# Patient Record
Sex: FEMALE | Race: WHITE | NOT HISPANIC OR LATINO | Employment: UNEMPLOYED | URBAN - METROPOLITAN AREA
[De-identification: names, ages, dates, MRNs, and addresses within clinical notes are randomized per-mention and may not be internally consistent; named-entity substitution may affect disease eponyms.]

---

## 2020-12-29 ENCOUNTER — HISTORICAL (OUTPATIENT)
Dept: ADMINISTRATIVE | Facility: HOSPITAL | Age: 55
End: 2020-12-29

## 2021-01-01 LAB — FINAL CULTURE: NORMAL

## 2021-07-07 ENCOUNTER — HISTORICAL (OUTPATIENT)
Dept: ADMINISTRATIVE | Facility: HOSPITAL | Age: 56
End: 2021-07-07

## 2021-07-10 LAB — FINAL CULTURE: NO GROWTH

## 2021-07-23 ENCOUNTER — TELEPHONE (OUTPATIENT)
Dept: RHEUMATOLOGY | Facility: CLINIC | Age: 56
End: 2021-07-23

## 2021-08-13 ENCOUNTER — OFFICE VISIT (OUTPATIENT)
Dept: RHEUMATOLOGY | Facility: CLINIC | Age: 56
End: 2021-08-13
Payer: MEDICAID

## 2021-08-13 VITALS
HEIGHT: 64 IN | OXYGEN SATURATION: 98 % | DIASTOLIC BLOOD PRESSURE: 87 MMHG | SYSTOLIC BLOOD PRESSURE: 133 MMHG | WEIGHT: 153 LBS | RESPIRATION RATE: 16 BRPM | HEART RATE: 84 BPM | BODY MASS INDEX: 26.12 KG/M2

## 2021-08-13 DIAGNOSIS — G62.9 NEUROPATHY: ICD-10-CM

## 2021-08-13 DIAGNOSIS — M15.8 OTHER OSTEOARTHRITIS INVOLVING MULTIPLE JOINTS: ICD-10-CM

## 2021-08-13 DIAGNOSIS — Z11.59 ENCOUNTER FOR HEPATITIS C SCREENING TEST FOR LOW RISK PATIENT: ICD-10-CM

## 2021-08-13 DIAGNOSIS — R68.89 NONSPECIFIC ABNORMAL FINDING: ICD-10-CM

## 2021-08-13 DIAGNOSIS — Z13.89 SCREENING FOR RHEUMATIC DISORDER: ICD-10-CM

## 2021-08-13 DIAGNOSIS — M25.50 POLYARTHRALGIA: Primary | ICD-10-CM

## 2021-08-13 DIAGNOSIS — Z11.1 SCREENING-PULMONARY TB: ICD-10-CM

## 2021-08-13 PROCEDURE — 99204 PR OFFICE/OUTPT VISIT, NEW, LEVL IV, 45-59 MIN: ICD-10-PCS | Mod: S$GLB,,, | Performed by: INTERNAL MEDICINE

## 2021-08-13 PROCEDURE — 99204 OFFICE O/P NEW MOD 45 MIN: CPT | Mod: S$GLB,,, | Performed by: INTERNAL MEDICINE

## 2021-08-13 RX ORDER — GABAPENTIN 300 MG/1
300 CAPSULE ORAL 2 TIMES DAILY
Qty: 60 CAPSULE | Refills: 1 | Status: SHIPPED | OUTPATIENT
Start: 2021-08-13 | End: 2021-09-22 | Stop reason: SDUPTHER

## 2021-08-13 RX ORDER — ESTRADIOL 1 MG/1
1 TABLET ORAL
COMMUNITY
Start: 2020-08-10

## 2021-08-13 RX ORDER — CARVEDILOL 3.12 MG/1
TABLET ORAL
COMMUNITY
Start: 2021-08-12

## 2021-08-13 RX ORDER — IBUPROFEN 200 MG
1 TABLET ORAL
COMMUNITY
Start: 2020-10-12

## 2021-08-13 RX ORDER — FLUTICASONE PROPIONATE 50 MCG
50 SPRAY, SUSPENSION (ML) NASAL
COMMUNITY
Start: 2020-07-08

## 2021-08-13 RX ORDER — FLUTICASONE FUROATE, UMECLIDINIUM BROMIDE AND VILANTEROL TRIFENATATE 200; 62.5; 25 UG/1; UG/1; UG/1
POWDER RESPIRATORY (INHALATION)
COMMUNITY
Start: 2021-03-24

## 2021-08-13 RX ORDER — DEXTROAMPHETAMINE SACCHARATE, AMPHETAMINE ASPARTATE, DEXTROAMPHETAMINE SULFATE AND AMPHETAMINE SULFATE 7.5; 7.5; 7.5; 7.5 MG/1; MG/1; MG/1; MG/1
30 TABLET ORAL
COMMUNITY
Start: 2020-09-18

## 2021-08-13 RX ORDER — LISINOPRIL 10 MG/1
10 TABLET ORAL DAILY
COMMUNITY
Start: 2021-05-17

## 2021-08-13 RX ORDER — ALBUTEROL SULFATE 90 UG/1
1 AEROSOL, METERED RESPIRATORY (INHALATION)
COMMUNITY
Start: 2020-09-11

## 2021-08-13 RX ORDER — CITALOPRAM 40 MG/1
40 TABLET, FILM COATED ORAL EVERY MORNING
COMMUNITY
Start: 2021-07-19

## 2021-08-13 RX ORDER — VARENICLINE TARTRATE 1 MG/1
1 TABLET, FILM COATED ORAL 2 TIMES DAILY
COMMUNITY
Start: 2021-06-17

## 2021-08-13 RX ORDER — ESCITALOPRAM OXALATE 20 MG/1
20 TABLET ORAL
COMMUNITY
Start: 2020-08-10

## 2021-08-13 RX ORDER — FAMOTIDINE 20 MG/1
20 TABLET, FILM COATED ORAL 2 TIMES DAILY
COMMUNITY

## 2021-08-13 RX ORDER — CLONAZEPAM 1 MG/1
1 TABLET ORAL
COMMUNITY
Start: 2020-09-11

## 2021-08-13 RX ORDER — KETOROLAC TROMETHAMINE 10 MG/1
TABLET, FILM COATED ORAL
COMMUNITY
Start: 2021-06-04 | End: 2022-04-27

## 2021-08-13 RX ORDER — MELOXICAM 15 MG/1
15 TABLET ORAL DAILY
COMMUNITY
Start: 2021-07-13 | End: 2022-01-27 | Stop reason: SDUPTHER

## 2021-08-13 RX ORDER — ROPINIROLE 0.5 MG/1
TABLET, FILM COATED ORAL
COMMUNITY
Start: 2021-08-12

## 2021-08-13 RX ORDER — ATORVASTATIN CALCIUM 40 MG/1
40 TABLET, FILM COATED ORAL DAILY
COMMUNITY
Start: 2021-08-11

## 2021-08-13 RX ORDER — FLUTICASONE PROPIONATE AND SALMETEROL XINAFOATE 115; 21 UG/1; UG/1
1 AEROSOL, METERED RESPIRATORY (INHALATION) 2 TIMES DAILY
COMMUNITY
Start: 2021-06-07 | End: 2022-12-20 | Stop reason: ALTCHOICE

## 2021-08-13 RX ORDER — ALBUTEROL SULFATE 0.83 MG/ML
SOLUTION RESPIRATORY (INHALATION)
COMMUNITY
Start: 2021-05-17

## 2021-09-22 ENCOUNTER — OFFICE VISIT (OUTPATIENT)
Dept: RHEUMATOLOGY | Facility: CLINIC | Age: 56
End: 2021-09-22
Payer: MEDICAID

## 2021-09-22 VITALS
OXYGEN SATURATION: 99 % | RESPIRATION RATE: 16 BRPM | WEIGHT: 157 LBS | HEIGHT: 64 IN | DIASTOLIC BLOOD PRESSURE: 74 MMHG | HEART RATE: 76 BPM | SYSTOLIC BLOOD PRESSURE: 125 MMHG | BODY MASS INDEX: 26.8 KG/M2

## 2021-09-22 DIAGNOSIS — R68.89 NONSPECIFIC ABNORMAL FINDING: ICD-10-CM

## 2021-09-22 DIAGNOSIS — M25.50 POLYARTHRALGIA: Primary | ICD-10-CM

## 2021-09-22 DIAGNOSIS — Z13.89 SCREENING FOR RHEUMATIC DISORDER: ICD-10-CM

## 2021-09-22 DIAGNOSIS — J44.9 CHRONIC OBSTRUCTIVE PULMONARY DISEASE, UNSPECIFIED COPD TYPE: ICD-10-CM

## 2021-09-22 DIAGNOSIS — G62.9 NEUROPATHY: ICD-10-CM

## 2021-09-22 DIAGNOSIS — F17.210 CIGARETTE SMOKER: ICD-10-CM

## 2021-09-22 DIAGNOSIS — M15.8 OTHER OSTEOARTHRITIS INVOLVING MULTIPLE JOINTS: ICD-10-CM

## 2021-09-22 PROCEDURE — 99406 BEHAV CHNG SMOKING 3-10 MIN: CPT | Mod: S$GLB,,, | Performed by: INTERNAL MEDICINE

## 2021-09-22 PROCEDURE — 99406 PR TOBACCO USE CESSATION INTERMEDIATE 3-10 MINUTES: ICD-10-PCS | Mod: S$GLB,,, | Performed by: INTERNAL MEDICINE

## 2021-09-22 PROCEDURE — 99214 OFFICE O/P EST MOD 30 MIN: CPT | Mod: 25,S$GLB,, | Performed by: INTERNAL MEDICINE

## 2021-09-22 PROCEDURE — 99214 PR OFFICE/OUTPT VISIT, EST, LEVL IV, 30-39 MIN: ICD-10-PCS | Mod: 25,S$GLB,, | Performed by: INTERNAL MEDICINE

## 2021-09-22 RX ORDER — PREDNISONE 20 MG/1
TABLET ORAL
COMMUNITY
Start: 2021-09-21 | End: 2022-01-27

## 2021-09-22 RX ORDER — GABAPENTIN 300 MG/1
300 CAPSULE ORAL 3 TIMES DAILY
Qty: 90 CAPSULE | Refills: 4 | Status: SHIPPED | OUTPATIENT
Start: 2021-09-22 | End: 2022-01-27

## 2021-11-12 ENCOUNTER — HISTORICAL (OUTPATIENT)
Dept: ADMINISTRATIVE | Facility: HOSPITAL | Age: 56
End: 2021-11-12

## 2021-11-14 LAB — FINAL CULTURE: NORMAL

## 2021-12-14 DIAGNOSIS — M17.12 OSTEOARTHRITIS OF LEFT KNEE: Primary | ICD-10-CM

## 2022-01-25 NOTE — PROGRESS NOTES
Subjective:      Patient ID: Romi Estrella is a 56 y.o. female.    Chief Complaint: Disease Management    HPI:   Includes joint pain with subjective swelling.   Antecedent event includes: None;  Pain location includes: joints;  Gradual onset; beginning >years ago;  Intermittent ache, Mild in severity,   Lasting >minutes, Worse during the daytime;   Improved with rest, medication, change in position and none;   Worsened with activity, overuse and stress;     Review of Systems   Constitutional: Positive for fatigue. Negative for chills and fever.   HENT: Negative for nasal congestion, ear pain, hearing loss, mouth dryness, mouth sores, nosebleeds and trouble swallowing.    Eyes: Negative for photophobia, pain, redness, visual disturbance and eye dryness.   Respiratory: Negative for cough and shortness of breath.    Cardiovascular: Negative for chest pain, palpitations and leg swelling.   Gastrointestinal: Negative for abdominal distention, abdominal pain, blood in stool, constipation, diarrhea, rectal pain, vomiting and fecal incontinence.   Endocrine: Positive for polydipsia. Negative for polyuria.   Genitourinary: Negative for bladder incontinence, dysuria, enuresis, genital sores and hematuria.   Musculoskeletal: Positive for arthralgias, joint swelling and myalgias.   Integumentary:  Negative for rash, wound and mole/lesion.   Neurological: Positive for dizziness and numbness. Negative for weakness and headaches.   Hematological: Negative for adenopathy. Bruises/bleeds easily.   Psychiatric/Behavioral: Negative for dysphoric mood and sleep disturbance. The patient is not nervous/anxious.       Past Medical History:   Diagnosis Date    Acid reflux     Anxiety     Asthma     Depression     Hyperlipidemia     Hypertension     Neuropathy     Tachycardia      Past Surgical History:   Procedure Laterality Date    ANKLE SURGERY      APPENDECTOMY      CARPAL TUNNEL RELEASE      CHOLECYSTECTOMY       HYSTERECTOMY        See the Assessment/Plan for further characterization of the HPI, ROS, Medical, Surgical, Family, and Social Histories including Tobacco use, Meds; all of which has been reviewed in Epic.    Medication List with Changes/Refills   New Medications    CHOLECALCIFEROL, VITAMIN D3, (VITAMIN D3) 25 MCG (1,000 UNIT) CAPSULE    Take 1 capsule (1,000 Units total) by mouth once daily.    TRAMADOL (ULTRAM) 50 MG TABLET    Take 2 tablets (100 mg total) by mouth 3 (three) times daily as needed for Pain (moderate to severe disease).   Current Medications    ADVAIR -21 MCG/ACTUATION HFAA INHALER    Inhale 1 puff into the lungs 2 (two) times daily.    ALBUTEROL (PROVENTIL) 2.5 MG /3 ML (0.083 %) NEBULIZER SOLUTION    use one vial in nebulizer four times a day as needed    ALBUTEROL (PROVENTIL/VENTOLIN HFA) 90 MCG/ACTUATION INHALER    Inhale 1 puff into the lungs.    ATORVASTATIN (LIPITOR) 40 MG TABLET    Take 40 mg by mouth once daily.    CARVEDILOL (COREG) 3.125 MG TABLET        CHANTIX CONTINUING MONTH BOX 1 MG TAB    Take 1 mg by mouth 2 (two) times daily.    CITALOPRAM (CELEXA) 40 MG TABLET    Take 40 mg by mouth every morning.    CLONAZEPAM (KLONOPIN) 1 MG TABLET    Take 1 mg by mouth.    DEXTROAMPHETAMINE-AMPHETAMINE 30 MG TAB    Take 30 mg by mouth.    ESCITALOPRAM OXALATE (LEXAPRO) 20 MG TABLET    Take 20 mg by mouth.    ESTRADIOL (ESTRACE) 1 MG TABLET    Take 1 mg by mouth.    FAMOTIDINE (PEPCID) 20 MG TABLET    Take 20 mg by mouth 2 (two) times daily.    FLUTICASONE PROPIONATE (FLONASE) 50 MCG/ACTUATION NASAL SPRAY    50 mcg by Nasal route.    KETOROLAC (TORADOL) 10 MG TABLET    TAKE ONE TABLET BY MOUTH THREE TIMES DAILY FOR 5 DAYS    LISINOPRIL 10 MG TABLET    Take 10 mg by mouth once daily.    METFORMIN (GLUCOPHAGE) 500 MG TABLET    Take 500 mg by mouth 2 (two) times daily.    NICOTINE (NICODERM CQ) 14 MG/24 HR    Place 1 patch onto the skin.    ROPINIROLE (REQUIP) 0.5 MG TABLET        TRELEGY  "ELLIPTA 200-62.5-25 MCG INHALER    inhale one puff by mouth once a day. rinse mouth after each dose   Changed and/or Refilled Medications    Modified Medication Previous Medication    GABAPENTIN (NEURONTIN) 300 MG CAPSULE gabapentin (NEURONTIN) 300 MG capsule       Take 1 capsule (300 mg total) by mouth 4 (four) times daily.    Take 1 capsule (300 mg total) by mouth 3 (three) times daily.    MELOXICAM (MOBIC) 15 MG TABLET meloxicam (MOBIC) 15 MG tablet       Take 1 tablet (15 mg total) by mouth daily as needed for Pain.    Take 15 mg by mouth once daily.   Discontinued Medications    PREDNISONE (DELTASONE) 20 MG TABLET             Objective:   /74   Pulse 63   Resp 16   Ht 5' 4" (1.626 m)   Wt 78.9 kg (174 lb)   SpO2 98%   BMI 29.87 kg/m²   Physical Exam  Non-toxic appearance. No distress.   Normocephalic and atraumatic.   External ears normal.    Conjunctivae and EOM are normal. No scleral icterus.   RRR, No friction rub; palpable distal pulses.    No tachypnea or signs of respiratory distress.   Abdominal: No guarding or rebound tenderness.   Neurological: Oriented. Normal thought content. No cranial nerve deficit. Strength is grossly normal without focal deficit.  Skin is warm. No pallor.   Musculoskeletal: DJD including thumb. see below for further input. No overt synovitis.     No image results found.    No visits with results within 1 Year(s) from this visit.   Latest known visit with results is:   No results found for any previous visit.        All of the data above and below has been reviewed by myself and any further interpretations will be reflected in the assessment and plan.   The data includes review of external notes, and independent interpretation of lab results, x-rays, and imaging reports.  Active inflammatory arthritis is an illness that poses a threat to bodily function and even a threat to life in some cases.  Drug therapy to treat inflammatory arthritis usually requires intensive " monitoring for toxicity.    Review of patient's allergies indicates:  No Known Allergies  Assessment/Plan:       Prev noted/prior plan:  (No overt synovitis but some recent prednisone noted  Diminished breath sounds but otherwise clear     I am not seeing active inflammatory arthritis or connective tissue disease on exam, but she had recent prednisone.     Verbal education and some written     Blood work to further evaluate     Add gabapentin 300mg qhs increasing to bid per response     Revisit 5 weeks   See below)     Last visit:     Interim lab 9/3/21:     WBC 8.5; Hgb 13.9; plt 236     Creat 0.93; alb 3.8     C3 155  C4 39     SPEP normal     LAURENT, RF, CCP not done     UA not done     HLAB27 neg Uric acid 5.5 HepBsAbsAgcoreAb neg HCV neg     CPK 76     ESR 19 CRP 7     She will consider getting lab elsewhere as some not done and they also never sent the results noted     She has some interim shoulder pain symptoms of inflammatory arthritis in feet and pain in legs     gabapentin 300mg bid      Meloxicam 15mg daily and toradol and      she should not combine choose one or other and clarify with prescribing MD as per ongoing plan     No overt synovitis but she is on prednisone 20mg for bronchitis with PCP noted  Diminished breath sounds prolonged expiration wheezing     Went over lab with her     She should consider also working with Orthopedics and Physical Medicine and Rehab MD if needed and      Verbal education     She will plan to call if problem or question or joint swelling recurs, but it sounds like mostly mechanical and some neurogenic pain     Increase gabapentin 300mg tid     Otherwise 4 months with lab 2 weeks prior     Smoking cessation counseled 3 minutes noted     Contact us prn    This visit:     Interim lab 1/13/22:      LAURENT neg CCP neg    C3 135  C4 28    Creat 0.99 GFR 58; alb 3.2 (prior 3.8)    glc 184    WBC 10.5; Hgb 11.9 MCV plt 261    UA SG>1.03    ESR 30 CRP 3      Meloxicam 15mg daily prn  and toradol and   she should not combine choose one or other and clarify with prescribing MD as per ongoing plan prev noted     gabapentin 300mg tid    She is currently finishing an antibiotic for COPD noted  Metformin added interim and she has been dieting and lost some weight    She is disabled from her COPD and thought COVID made things worse; on O2 at night; no interim prednisone    She has some interim symptoms of inflammatory arthritis; also neurogenic and mechanical pain; including feet and hands; neck back hips knees    No overt synovitis  Diminished breath sounds but mostly clear    Went over lab     Sat and talked listened    Verbal education    Xrays to further evaluate    DEXA screening     Add vitamin D 1000 units daily if not using    Increase gabapentin 300mg qid and could be increased further     Refill meloxicam 15mg daily prn    Add tramadol 100mg tid prn #7 day supply with care and caveats so that her insurance will cover and plan for her to stretch it out as may not need everyday    Revisit with lab 2 weeks prior    Contact us prn      ANA1:320+ with negative specific Ab repeat LAURENT neg RF neg CCP neg HLAB27 neg Uric acid 5.5 HepBsAbsAgcoreAb neg HCV neg     There is some history to suggest inflammatory arthritis.  Joint pain and swelling in knees  Neurogenic pain in legs and feet     Prev noted/prior plan:  (No overt synovitis but some recent prednisone noted  Diminished breath sounds but otherwise clear   I am not seeing active inflammatory arthritis or connective tissue disease on exam, but she had recent prednisone.   Verbal education and some written   Blood work to further evaluate  Add gabapentin 300mg qhs increasing to bid per response  Revisit 5 weeks   See below)     She tells me when she has joint pain and swelling,   she will have skin changes and bumps in the palm of her hands prior to the onset;   she will plan to have evaluation with Dermatology and see if she can also take a  picture when recurs     There is also some mechanical and neurogenic pain.     Neuropathy per records     Carpal tunnel surgery     ankle surgery     severe COPD; steroid dependent; chronic hypercapnic respiratory failure; history COVID infection; history recurrent Pneumonias;      Has been managing with:     Meloxicam 15mg daily     Toradol she should not combine with meloxicam which she uses meloxicam     Prednisone 20mg bid 7 days given weeks prior to initial visit     requip      adderall noted     Citalopram      she rather clarifies she no longer uses Lexapro noted     Albuterol     lipitor     She tells me she also has diabetes and had been on insulin in the past noted     Has been working with:     Severe COPD working with Pulmonary     Records PCP Saulojens Jeffries FNMARIN some of most recent note 7/1/21: HPI cough and wheeze; A/P: COPD acute respiratory infection; LAURENT+: Prednisone 20mg bid for 7 days; levofloxacin 500mg daily 7 days     Records Dr. Leblanc 6/26/21: COPD likely severe; chronic hypercapnic respiratory failure; history COVID infection; history recurrent Pneumonias; active tobacco use; obtain CXR and PFTs from PCP or her Pulmonologist; cont inhaler with trelegy for maintenance therapy and albuterol nebulizer treatments; review ABG results from recent hospitalization and assess need for trilogy machine. F/u 1 month     Records Payal CHUNG some of recent note 3/24/21: Hospitalized 12/2020 [facility] for sepsis and bladder infection; completed antibiotics and symptoms resolved. Impression: chronic respiratory failure; severe COPD; steroid dependent; levaquin and prednisone taper; CT chest; cont chatix; revisit 6-8 weeks     Review of medical records as stated above.  Lab +/- imaging and other ordered diagnostic studies to further evaluate.  See the orders associated with this note visit.  Medications as prescribed as tolerated.    Education including verbal and those noted in the  patient instructions.  Revisit as scheduled and call prn.    The following diagnoses influence medical decision making and/or need further workup including the orders listed below:    Polyarthralgia  -     LAURENT by IFA, w/Rflx; Future; Expected date: 04/11/2022  -     Comprehensive Metabolic Panel; Future; Expected date: 04/11/2022  -     C-Reactive Protein; Future; Expected date: 04/11/2022  -     Anti-DNA Ab, Double-Stranded; Future; Expected date: 04/11/2022  -     C3 Complement; Future; Expected date: 04/11/2022  -     C4 Complement; Future; Expected date: 04/11/2022  -     CBC Auto Differential; Future; Expected date: 04/11/2022  -     Urinalysis Microscopic; Future; Expected date: 04/11/2022  -     Sedimentation rate; Future; Expected date: 04/11/2022  -     CK; Future; Expected date: 04/11/2022  -     traMADoL (ULTRAM) 50 mg tablet; Take 2 tablets (100 mg total) by mouth 3 (three) times daily as needed for Pain (moderate to severe disease).  Dispense: 42 tablet; Refill: 0  -     meloxicam (MOBIC) 15 MG tablet; Take 1 tablet (15 mg total) by mouth daily as needed for Pain.  Dispense: 30 tablet; Refill: 4  -     X-Ray Hand 3 View Bilateral; Future; Expected date: 01/27/2022  -     X-Ray Hips Bilateral 2 View Inc AP Pelvis; Future; Expected date: 01/27/2022  -     X-Ray Cervical Spine AP And Lateral; Future; Expected date: 01/27/2022  -     X-ray AP Standing Knees with Both Latera; Future; Expected date: 01/27/2022  -     X-Ray Foot 2 View Bilateral; Future; Expected date: 01/27/2022  -     X-Ray Lumbar Spine AP And Lateral; Future; Expected date: 01/27/2022    Neuropathy  -     LAURENT by IFA, w/Rflx; Future; Expected date: 04/11/2022  -     Comprehensive Metabolic Panel; Future; Expected date: 04/11/2022  -     C-Reactive Protein; Future; Expected date: 04/11/2022  -     Anti-DNA Ab, Double-Stranded; Future; Expected date: 04/11/2022  -     C3 Complement; Future; Expected date: 04/11/2022  -     C4 Complement;  Future; Expected date: 04/11/2022  -     CBC Auto Differential; Future; Expected date: 04/11/2022  -     Urinalysis Microscopic; Future; Expected date: 04/11/2022  -     Sedimentation rate; Future; Expected date: 04/11/2022  -     CK; Future; Expected date: 04/11/2022  -     traMADoL (ULTRAM) 50 mg tablet; Take 2 tablets (100 mg total) by mouth 3 (three) times daily as needed for Pain (moderate to severe disease).  Dispense: 42 tablet; Refill: 0  -     gabapentin (NEURONTIN) 300 MG capsule; Take 1 capsule (300 mg total) by mouth 4 (four) times daily.  Dispense: 120 capsule; Refill: 4    Other osteoarthritis involving multiple joints  -     LAURENT by IFA, w/Rflx; Future; Expected date: 04/11/2022  -     Comprehensive Metabolic Panel; Future; Expected date: 04/11/2022  -     C-Reactive Protein; Future; Expected date: 04/11/2022  -     Anti-DNA Ab, Double-Stranded; Future; Expected date: 04/11/2022  -     C3 Complement; Future; Expected date: 04/11/2022  -     C4 Complement; Future; Expected date: 04/11/2022  -     CBC Auto Differential; Future; Expected date: 04/11/2022  -     Urinalysis Microscopic; Future; Expected date: 04/11/2022  -     Sedimentation rate; Future; Expected date: 04/11/2022  -     CK; Future; Expected date: 04/11/2022  -     traMADoL (ULTRAM) 50 mg tablet; Take 2 tablets (100 mg total) by mouth 3 (three) times daily as needed for Pain (moderate to severe disease).  Dispense: 42 tablet; Refill: 0  -     meloxicam (MOBIC) 15 MG tablet; Take 1 tablet (15 mg total) by mouth daily as needed for Pain.  Dispense: 30 tablet; Refill: 4  -     X-Ray Hand 3 View Bilateral; Future; Expected date: 01/27/2022  -     X-Ray Hips Bilateral 2 View Inc AP Pelvis; Future; Expected date: 01/27/2022  -     X-Ray Cervical Spine AP And Lateral; Future; Expected date: 01/27/2022  -     X-ray AP Standing Knees with Both Latera; Future; Expected date: 01/27/2022  -     X-Ray Foot 2 View Bilateral; Future; Expected date:  01/27/2022  -     X-Ray Lumbar Spine AP And Lateral; Future; Expected date: 01/27/2022    Nonspecific abnormal finding  -     LAURENT by IFA, w/Rflx; Future; Expected date: 04/11/2022  -     Comprehensive Metabolic Panel; Future; Expected date: 04/11/2022  -     C-Reactive Protein; Future; Expected date: 04/11/2022  -     Anti-DNA Ab, Double-Stranded; Future; Expected date: 04/11/2022  -     C3 Complement; Future; Expected date: 04/11/2022  -     C4 Complement; Future; Expected date: 04/11/2022  -     CBC Auto Differential; Future; Expected date: 04/11/2022  -     Urinalysis Microscopic; Future; Expected date: 04/11/2022  -     Sedimentation rate; Future; Expected date: 04/11/2022  -     CK; Future; Expected date: 04/11/2022    Chronic obstructive pulmonary disease, unspecified COPD type  -     LAURENT by IFA, w/Rflx; Future; Expected date: 04/11/2022  -     Comprehensive Metabolic Panel; Future; Expected date: 04/11/2022  -     C-Reactive Protein; Future; Expected date: 04/11/2022  -     Anti-DNA Ab, Double-Stranded; Future; Expected date: 04/11/2022  -     C3 Complement; Future; Expected date: 04/11/2022  -     C4 Complement; Future; Expected date: 04/11/2022  -     CBC Auto Differential; Future; Expected date: 04/11/2022  -     Urinalysis Microscopic; Future; Expected date: 04/11/2022  -     Sedimentation rate; Future; Expected date: 04/11/2022  -     CK; Future; Expected date: 04/11/2022    Screening for rheumatic disorder  -     LAURENT by IFA, w/Rflx; Future; Expected date: 04/11/2022  -     Comprehensive Metabolic Panel; Future; Expected date: 04/11/2022  -     C-Reactive Protein; Future; Expected date: 04/11/2022  -     Anti-DNA Ab, Double-Stranded; Future; Expected date: 04/11/2022  -     C3 Complement; Future; Expected date: 04/11/2022  -     C4 Complement; Future; Expected date: 04/11/2022  -     CBC Auto Differential; Future; Expected date: 04/11/2022  -     Urinalysis Microscopic; Future; Expected date:  04/11/2022  -     Sedimentation rate; Future; Expected date: 04/11/2022  -     CK; Future; Expected date: 04/11/2022  -     X-Ray Hand 3 View Bilateral; Future; Expected date: 01/27/2022  -     X-Ray Hips Bilateral 2 View Inc AP Pelvis; Future; Expected date: 01/27/2022  -     X-Ray Cervical Spine AP And Lateral; Future; Expected date: 01/27/2022  -     X-ray AP Standing Knees with Both Latera; Future; Expected date: 01/27/2022  -     X-Ray Foot 2 View Bilateral; Future; Expected date: 01/27/2022  -     X-Ray Lumbar Spine AP And Lateral; Future; Expected date: 01/27/2022    Renal insufficiency  -     LAURENT by IFA, w/Rflx; Future; Expected date: 04/11/2022  -     Comprehensive Metabolic Panel; Future; Expected date: 04/11/2022  -     C-Reactive Protein; Future; Expected date: 04/11/2022  -     Anti-DNA Ab, Double-Stranded; Future; Expected date: 04/11/2022  -     C3 Complement; Future; Expected date: 04/11/2022  -     C4 Complement; Future; Expected date: 04/11/2022  -     CBC Auto Differential; Future; Expected date: 04/11/2022  -     Urinalysis Microscopic; Future; Expected date: 04/11/2022  -     Sedimentation rate; Future; Expected date: 04/11/2022  -     CK; Future; Expected date: 04/11/2022    Long term (current) use of systemic steroids  -     DXA Bone Density Spine And Hip; Future; Expected date: 01/27/2022  -     cholecalciferol, vitamin D3, (VITAMIN D3) 25 mcg (1,000 unit) capsule; Take 1 capsule (1,000 Units total) by mouth once daily.  Dispense: 30 capsule; Refill: 12    Screening for osteoporosis  -     DXA Bone Density Spine And Hip; Future; Expected date: 01/27/2022  -     cholecalciferol, vitamin D3, (VITAMIN D3) 25 mcg (1,000 unit) capsule; Take 1 capsule (1,000 Units total) by mouth once daily.  Dispense: 30 capsule; Refill: 12      Follow up in about 3 months (around 4/27/2022).     Zachery Brooks MD

## 2022-01-27 ENCOUNTER — OFFICE VISIT (OUTPATIENT)
Dept: RHEUMATOLOGY | Facility: CLINIC | Age: 57
End: 2022-01-27
Payer: MEDICAID

## 2022-01-27 VITALS
BODY MASS INDEX: 29.71 KG/M2 | SYSTOLIC BLOOD PRESSURE: 120 MMHG | WEIGHT: 174 LBS | HEART RATE: 63 BPM | HEIGHT: 64 IN | OXYGEN SATURATION: 98 % | DIASTOLIC BLOOD PRESSURE: 74 MMHG | RESPIRATION RATE: 16 BRPM

## 2022-01-27 DIAGNOSIS — G62.9 NEUROPATHY: ICD-10-CM

## 2022-01-27 DIAGNOSIS — M25.50 POLYARTHRALGIA: Primary | ICD-10-CM

## 2022-01-27 DIAGNOSIS — Z13.820 SCREENING FOR OSTEOPOROSIS: ICD-10-CM

## 2022-01-27 DIAGNOSIS — J44.9 CHRONIC OBSTRUCTIVE PULMONARY DISEASE, UNSPECIFIED COPD TYPE: ICD-10-CM

## 2022-01-27 DIAGNOSIS — Z79.52 LONG TERM (CURRENT) USE OF SYSTEMIC STEROIDS: ICD-10-CM

## 2022-01-27 DIAGNOSIS — Z13.89 SCREENING FOR RHEUMATIC DISORDER: ICD-10-CM

## 2022-01-27 DIAGNOSIS — R68.89 NONSPECIFIC ABNORMAL FINDING: ICD-10-CM

## 2022-01-27 DIAGNOSIS — M15.8 OTHER OSTEOARTHRITIS INVOLVING MULTIPLE JOINTS: ICD-10-CM

## 2022-01-27 DIAGNOSIS — N28.9 RENAL INSUFFICIENCY: ICD-10-CM

## 2022-01-27 PROCEDURE — 99214 PR OFFICE/OUTPT VISIT, EST, LEVL IV, 30-39 MIN: ICD-10-PCS | Mod: S$GLB,,, | Performed by: INTERNAL MEDICINE

## 2022-01-27 PROCEDURE — 3074F SYST BP LT 130 MM HG: CPT | Mod: CPTII,S$GLB,, | Performed by: INTERNAL MEDICINE

## 2022-01-27 PROCEDURE — 3074F PR MOST RECENT SYSTOLIC BLOOD PRESSURE < 130 MM HG: ICD-10-PCS | Mod: CPTII,S$GLB,, | Performed by: INTERNAL MEDICINE

## 2022-01-27 PROCEDURE — 1159F PR MEDICATION LIST DOCUMENTED IN MEDICAL RECORD: ICD-10-PCS | Mod: CPTII,S$GLB,, | Performed by: INTERNAL MEDICINE

## 2022-01-27 PROCEDURE — 3008F BODY MASS INDEX DOCD: CPT | Mod: CPTII,S$GLB,, | Performed by: INTERNAL MEDICINE

## 2022-01-27 PROCEDURE — 99214 OFFICE O/P EST MOD 30 MIN: CPT | Mod: S$GLB,,, | Performed by: INTERNAL MEDICINE

## 2022-01-27 PROCEDURE — 3078F PR MOST RECENT DIASTOLIC BLOOD PRESSURE < 80 MM HG: ICD-10-PCS | Mod: CPTII,S$GLB,, | Performed by: INTERNAL MEDICINE

## 2022-01-27 PROCEDURE — 3078F DIAST BP <80 MM HG: CPT | Mod: CPTII,S$GLB,, | Performed by: INTERNAL MEDICINE

## 2022-01-27 PROCEDURE — 1160F RVW MEDS BY RX/DR IN RCRD: CPT | Mod: CPTII,S$GLB,, | Performed by: INTERNAL MEDICINE

## 2022-01-27 PROCEDURE — 1160F PR REVIEW ALL MEDS BY PRESCRIBER/CLIN PHARMACIST DOCUMENTED: ICD-10-PCS | Mod: CPTII,S$GLB,, | Performed by: INTERNAL MEDICINE

## 2022-01-27 PROCEDURE — 1159F MED LIST DOCD IN RCRD: CPT | Mod: CPTII,S$GLB,, | Performed by: INTERNAL MEDICINE

## 2022-01-27 PROCEDURE — 3008F PR BODY MASS INDEX (BMI) DOCUMENTED: ICD-10-PCS | Mod: CPTII,S$GLB,, | Performed by: INTERNAL MEDICINE

## 2022-01-27 RX ORDER — METFORMIN HYDROCHLORIDE 500 MG/1
500 TABLET ORAL 2 TIMES DAILY
COMMUNITY
Start: 2022-01-20 | End: 2022-12-20 | Stop reason: ALTCHOICE

## 2022-01-27 RX ORDER — TRAMADOL HYDROCHLORIDE 50 MG/1
100 TABLET ORAL 3 TIMES DAILY PRN
Qty: 42 TABLET | Refills: 0 | Status: SHIPPED | OUTPATIENT
Start: 2022-01-27 | End: 2022-02-03

## 2022-01-27 RX ORDER — GABAPENTIN 300 MG/1
300 CAPSULE ORAL 4 TIMES DAILY
Qty: 120 CAPSULE | Refills: 4 | Status: SHIPPED | OUTPATIENT
Start: 2022-01-27 | End: 2022-04-27 | Stop reason: SDUPTHER

## 2022-01-27 RX ORDER — MELOXICAM 15 MG/1
15 TABLET ORAL DAILY PRN
Qty: 30 TABLET | Refills: 4 | Status: SHIPPED | OUTPATIENT
Start: 2022-01-27 | End: 2022-04-27 | Stop reason: SDUPTHER

## 2022-01-27 RX ORDER — VIT C/E/ZN/COPPR/LUTEIN/ZEAXAN 250MG-90MG
1000 CAPSULE ORAL DAILY
Qty: 30 CAPSULE | Refills: 12 | Status: SHIPPED | OUTPATIENT
Start: 2022-01-27 | End: 2022-04-27 | Stop reason: SDUPTHER

## 2022-02-28 RX ORDER — TRAMADOL HYDROCHLORIDE 50 MG/1
100 TABLET ORAL 3 TIMES DAILY PRN
Qty: 42 TABLET | Refills: 0 | Status: SHIPPED | OUTPATIENT
Start: 2022-02-28 | End: 2022-03-07

## 2022-02-28 RX ORDER — TRAMADOL HYDROCHLORIDE 50 MG/1
50 TABLET ORAL EVERY 6 HOURS
COMMUNITY
End: 2022-02-28 | Stop reason: SDUPTHER

## 2022-04-13 NOTE — PROGRESS NOTES
Subjective:      Patient ID: Romi Estrella is a 56 y.o. female.    Chief Complaint: Disease Management    HPI:   Includes joint pain with subjective swelling.   Antecedent event includes: None;  Pain location includes: shoulders, hands, knees, feet and back;  Gradual onset; beginning >years ago;  Constant ache, Mild in severity,   Lasting >minutes, Worse during the daytime;   Improved with rest, medication, change in position and none;   Worsened with activity and overuse;     Review of Systems   Constitutional: Positive for fatigue. Negative for chills and fever.   HENT: Negative for nasal congestion, ear pain, hearing loss, mouth dryness, mouth sores, nosebleeds and trouble swallowing.    Eyes: Negative for photophobia, pain, redness, visual disturbance and eye dryness.   Respiratory: Positive for shortness of breath. Negative for cough.    Cardiovascular: Positive for palpitations. Negative for chest pain and leg swelling.   Gastrointestinal: Negative for abdominal distention, abdominal pain, blood in stool, constipation, diarrhea, rectal pain, vomiting and fecal incontinence.   Endocrine: Negative for polydipsia and polyuria.   Genitourinary: Positive for nocturia. Negative for bladder incontinence, dysuria, enuresis, genital sores and hematuria.   Musculoskeletal: Positive for arthralgias, back pain, joint swelling and myalgias.   Integumentary:  Negative for rash, wound and mole/lesion.   Neurological: Positive for weakness, numbness and headaches.   Hematological: Negative for adenopathy. Bruises/bleeds easily.   Psychiatric/Behavioral: Negative for dysphoric mood and sleep disturbance. The patient is nervous/anxious.       Past Medical History:   Diagnosis Date    Acid reflux     Anxiety     Asthma     Depression     Hyperlipidemia     Hypertension     Neuropathy     Tachycardia      Past Surgical History:   Procedure Laterality Date    ANKLE SURGERY      APPENDECTOMY      CARPAL TUNNEL RELEASE       CHOLECYSTECTOMY      HYSTERECTOMY        See the Assessment/Plan for further characterization of the HPI, ROS, Medical, Surgical, Family, and Social Histories including Tobacco use, Meds; all of which has been reviewed in Epic.    Medication List with Changes/Refills   New Medications    TRAMADOL (ULTRAM) 50 MG TABLET    Take 1 tablet (50 mg total) by mouth 2 (two) times daily as needed (moderate to severe pain).   Current Medications    ADVAIR -21 MCG/ACTUATION HFAA INHALER    Inhale 1 puff into the lungs 2 (two) times daily.    ALBUTEROL (PROVENTIL) 2.5 MG /3 ML (0.083 %) NEBULIZER SOLUTION    use one vial in nebulizer four times a day as needed    ALBUTEROL (PROVENTIL/VENTOLIN HFA) 90 MCG/ACTUATION INHALER    Inhale 1 puff into the lungs.    ATORVASTATIN (LIPITOR) 40 MG TABLET    Take 40 mg by mouth once daily.    BUTALBITAL-ACETAMINOPHEN-CAFF -40 MG CAP    TAKE ONE CAPSULE BY MOUTH EVERY 6 HOURS AS NEEDED FOR HEADACHE    CARVEDILOL (COREG) 3.125 MG TABLET        CHANTIX CONTINUING MONTH BOX 1 MG TAB    Take 1 mg by mouth 2 (two) times daily.    CITALOPRAM (CELEXA) 40 MG TABLET    Take 40 mg by mouth every morning.    CLONAZEPAM (KLONOPIN) 1 MG TABLET    Take 1 mg by mouth.    DEXTROAMPHETAMINE-AMPHETAMINE 30 MG TAB    Take 30 mg by mouth.    ESCITALOPRAM OXALATE (LEXAPRO) 20 MG TABLET    Take 20 mg by mouth.    ESTRADIOL (ESTRACE) 1 MG TABLET    Take 1 mg by mouth.    FAMOTIDINE (PEPCID) 20 MG TABLET    Take 20 mg by mouth 2 (two) times daily.    FLUTICASONE PROPIONATE (FLONASE) 50 MCG/ACTUATION NASAL SPRAY    50 mcg by Nasal route.    LISINOPRIL 10 MG TABLET    Take 10 mg by mouth once daily.    METFORMIN (GLUCOPHAGE) 500 MG TABLET    Take 500 mg by mouth 2 (two) times daily.    NICOTINE (NICODERM CQ) 14 MG/24 HR    Place 1 patch onto the skin.    ROPINIROLE (REQUIP) 0.5 MG TABLET        TRELEGY ELLIPTA 200-62.5-25 MCG INHALER    inhale one puff by mouth once a day. rinse mouth after each  "dose   Changed and/or Refilled Medications    Modified Medication Previous Medication    CHOLECALCIFEROL, VITAMIN D3, (VITAMIN D3) 25 MCG (1,000 UNIT) CAPSULE cholecalciferol, vitamin D3, (VITAMIN D3) 25 mcg (1,000 unit) capsule       Take 1 capsule (1,000 Units total) by mouth once daily.    Take 1 capsule (1,000 Units total) by mouth once daily.    GABAPENTIN (NEURONTIN) 300 MG CAPSULE gabapentin (NEURONTIN) 300 MG capsule       Take 1 capsule (300 mg total) by mouth 4 (four) times daily.    Take 1 capsule (300 mg total) by mouth 4 (four) times daily.    MELOXICAM (MOBIC) 15 MG TABLET meloxicam (MOBIC) 15 MG tablet       Take 1 tablet (15 mg total) by mouth daily as needed for Pain.    Take 1 tablet (15 mg total) by mouth daily as needed for Pain.   Discontinued Medications    KETOROLAC (TORADOL) 10 MG TABLET    TAKE ONE TABLET BY MOUTH THREE TIMES DAILY FOR 5 DAYS    TRAMADOL 100 MG TAB    Take 100 mg by mouth 3 (three) times daily as needed (moderate to severe pain).         Objective:   /64   Pulse (!) 56   Resp 15   Ht 5' 4" (1.626 m)   Wt 70.3 kg (155 lb)   SpO2 98%   BMI 26.61 kg/m²   Physical Exam  Non-toxic appearance. No distress.   Normocephalic and atraumatic.   External ears normal.    Conjunctivae and EOM are normal. No scleral icterus.   RRR, No friction rub; palpable distal pulses.    No tachypnea or signs of respiratory distress.   Abdominal: No guarding or rebound tenderness.   Neurological: Oriented. Normal thought content. No cranial nerve deficit. Strength is grossly normal without focal deficit.  Skin is warm. No pallor.   Musculoskeletal: DJD. see below for further input. No overt synovitis.     [facility] UNKNOWN RAD EAP                                RADIOLOGY REPORT        PT NAME: MUNA RAWLS      [facility]     : 1965 F 56             [address]    ACCT: [id number]                                              [facility] Rec #: " [id number]                                        [id number]    Patient Location: [place].[facility]/             Procedure: FOOT LIMITED 2 VIEWS    REQUISITION #: [id number]      REPORT #: [id number]           DATE OF EXAM: 22    TIME OF EXAM: 1236       Technique: Frontal, oblique, and lateral views of the right foot.        Clinical history: Arthritis.        COMPARISON: None.        FINDINGS:        No fracture or dislocation. Family mild joint space loss at the   interphalangeal joints of the toes and at the first digit tarsometatarsal   joint. No significant osseous erosions seen. Screws are noted in the medial    malleolus of the right ankle. Soft tissues unremarkable.        IMPRESSION:        Only mild osteoarthritic changes in the right foot. Screws noted in the   medial malleolus of the right ankle.                DICTATING PHYSICIAN:  LAST HOFFMAN MD                   Date Dictated: 22        Signed By:  LAST HOFFMAN MD <Electronically signed by LAST HOFFMAN MD in    OV>    Date Signed:  22     CC: RONEN CASTRO MD ; RONEN CASTRO MD      ADMITTING PHYSICIAN:                                                                                                    ORDERING PHY: RONEN CASTRO MD                                                                                                                                                      ATTENDING PHY: RONEN CASTRO MD    Patient Status:  REG CLI    Admit Service Date: 22       X-Ray Lumbar Spine 2 Or 3 Views                                RADIOLOGY REPORT        PT NAME: MUNA RAWLS      [facility]     : 1965 F 56             [address]    ACCT: [id number]                                              [facility] Rec #: [id number]                                        [id number]    Patient Location: [place]/             Procedure: SPINE LUMBSAC LM 2V OR 3V    REQUISITION #: [id number]       REPORT #: [id number]           DATE OF EXAM: 22    TIME OF EXAM: 1237       CLINICAL HISTORY:    Arthritis        TECHNIQUE:    Views: 3 views of the lumbar spine    Limitations: No technical limitations.        COMPARISON:    No prior relevant studies.        FINDINGS:    Alignment:Normal.        Vertebral bodies and posterior elements:    1. Degenerative changes noted in the posterior facets from L3 to S1.        Disc spaces: Normal.        Soft tissues: Normal.        Additional findings: None seen.        IMPRESSION:        1.  Degenerative changes.            This document was created using a voice recognition transcribing system.   Incorrect words or phrases may have been missed during proof reading. Please   interpret accordingly or contact the radiologist for clarification if   necessary.        DICTATING PHYSICIAN:  GERMÁN CERRATO MD                   Date Dictated: 22        Signed By:  GERMÁN CERRATO MD <Electronically signed by GERMÁN CERRATO MD in OV>    Date Signed:  22     CC: RONEN CASTRO MD ; RONEN CASTRO MD      ADMITTING PHYSICIAN:                                                                                                    ORDERING PHY: RONEN CASTRO MD                                                                                                                                                      ATTENDING PHY: RONEN CASTRO MD    Patient Status:  REG CLI    Admit Service Date: 22       X-Ray Cervical Spine 2 or 3 Views                                RADIOLOGY REPORT        PT NAME: MUNA RAWLS      [facility]     : 1965 F 56             [address]    ACCT: [id number]                                              [facility] Rec #: [id number]                                        [id number]    Patient Location: [place]/             Procedure: SPINE CERV 2V or 3V    REQUISITION #: [id number]       REPORT #: [id number]           DATE OF EXAM: 22    TIME OF EXAM: 1235       CLINICAL DATA:    Arthritis        TECHNIQUE:    Views: 3 views of the cervical spine were obtained.    Limitations: The images are technically satisfactory.        COMPARISON:    No prior relevant studies are available.        FINDINGS:    Alignment: Normal.        Vertebrae and posterior elements: Normal.        Disc spaces: Normal.        Craniocervical junction: Normal.        C1 and C2: Normal.        Soft tissues: Normal.        Additional findings: None seen.        IMPRESSION:        1.  Negative examination.            This document was created using a voice recognition transcribing system.   Incorrect words or phrases may have been missed during proof reading. Please   interpret accordingly or contact the radiologist for clarification if   necessary.        DICTATING PHYSICIAN:  GERMÁN CERRATO MD                   Date Dictated: 22        Signed By:  GERMÁN CERRATO MD <Electronically signed by GERMÁN CERRATO MD in OV>    Date Signed:  22     CC: RONEN CASTRO MD ; RONEN CASTRO MD      ADMITTING PHYSICIAN:                                                                                                    ORDERING PHY: RONEN CASTRO MD                                                                                                                                                      ATTENDING PHY: RONEN CASTRO MD    Patient Status:  REG CLI    Admit Service Date: 22       X-Ray Hips Bilateral 2 View Inc AP Pelvis                                RADIOLOGY REPORT        PT NAME: MUNA RAWLS      [facility]     : 1965 F 56             [address]    ACCT: [id number]                                              [facility] Rec #: [id number]                                        [address]    Patient Location: [place]/             Procedure: HIPS CASE  MIN 2 V W PELVIS    REQUISITION #: [id number]      REPORT #: [id number]           DATE OF EXAM: 22    TIME OF EXAM: 1236       Clinical data:    Arthritis        Technique:    Views: A single view the pelvis and 2 views of each hip    Limitations: The images are technically satisfactory.        Comparison:    No prior relevant studies are available.        Findings:    No osseous, articular or soft tissue abnormality seen.        Impression:        1.  Negative exam.            This document was created using a voice recognition transcribing system.   Incorrect words or phrases may have been missed during proof reading. Please   interpret accordingly or contact the radiologist for clarification if   necessary.        DICTATING PHYSICIAN:  GERMÁN CERRATO MD                   Date Dictated: 22        Signed By:  GERMÁN CERRATO MD <Electronically signed by GERMÁN CERRATO MD in OV>    Date Signed:  22     CC: RONEN CASTRO MD ; RONEN CASTRO MD      ADMITTING PHYSICIAN:                                                                                                    ORDERING PHY: RONEN CASTRO MD                                                                                                                                                      ATTENDING PHY: RONEN CASTRO MD    Patient Status:  REG CLI    Admit Service Date: 22       [facility] UNKNOWN RAD EAP                                RADIOLOGY REPORT        PT NAME: MUNA RAWLS      [facility]     : 1965 F 56             [address]    ACCT: [id number]                                              [facility] Rec #: [id number]                                        [address]    Patient Location: [place]/             Procedure: FOOT LIMITED 2 VIEWS    REQUISITION #: [id number]      REPORT #: [id number]           DATE OF EXAM: 22    TIME OF EXAM: 1236       PROCEDURE: FOOT  LIMITED 2 VIEWS Left        CLINICAL DATA:    Arthritis        TECHNIQUE:    Views: 3 views of the left foot    Limitations: The images appear technically satisfactory.        COMPARISON:    No prior relevant studies are available at this time.        FINDINGS:    Osseous structures: Normal.        Joints: Normal.        Soft tissues: Normal.        Additional findings: None seen.        IMPRESSION:        1.  Negative examination.            This document was created using a voice recognition transcribing system.   Incorrect words or phrases may have been missed during proof reading. Please   interpret accordingly or contact the radiologist for clarification if   necessary.        DICTATING PHYSICIAN:  GERMÁN CERRATO MD                   Date Dictated: 22        Signed By:  GERMÁN CERRATO MD <Electronically signed by GERMÁN CERRATO MD in OV>    Date Signed:  22     CC: RONEN CASTRO MD ; RONEN CASTRO MD      ADMITTING PHYSICIAN:                                                                                                    ORDERING PHY: RONEN CASTOR MD                                                                                                                                                      ATTENDING PHY: RONEN CASTRO MD    Patient Status:  REG CLI    Admit Service Date: 22       [facility] UNKNOWN RAD EAP                                RADIOLOGY REPORT        PT NAME: MUNA RAWLS      [facility]     : 1965 F 56             [address]    ACCT: [id number]                                              [facility] Rec #: [id number]                                        [id number]    Patient Location: [place]/             Procedure: KNEE LIMITED 1 or 2 VIEWS    REQUISITION #: [id number]      REPORT #: [id number]           DATE OF EXAM: 22    TIME OF EXAM: 1236       Frontal and lateral views of the bilateral knees.         HISTORY:  Arthritis.        COMPARISON: No prior similar studies are available for comparison.        FINDINGS:        No fracture or dislocation.  No joint effusion.        Mild degenerative changes with osteophytes, joint space narrowing, and   subchondral sclerosis.    Sclerotic lesion within the distal right femur.        The surrounding soft tissues are unremarkable. No radiopaque foreign body.         IMPRESSION:        1. Mild bilateral knee osteoarthritis.    2. Sclerotic lesion within the distal right femur likely represents an   enchondroma. Does the patient have prior imaging available for comparison?   Consider MRI with contrast if further evaluation is clinically warranted.        DICTATING PHYSICIAN:  ANA MARÍA OLIVO MD                   Date Dictated: 22        Signed By:  ANA MARÍA OLIVO MD <Electronically signed by ANA MARÍA OLIVO MD in    OV>    Date Signed:  22     CC: RONEN CASTRO MD ; RONEN CASTRO MD      ADMITTING PHYSICIAN:                                                                                                    ORDERING PHY: RONEN CASTRO MD                                                                                                                                                      ATTENDING PHY: RONEN CASTRO MD    Patient Status:  REG CLI    Admit Service Date: 22       [facility] UNKNOWN RAD EAP                                RADIOLOGY REPORT        PT NAME: MUNA RAWLS      [facility]     : 1965 F 56             [address]    ACCT: [id number]                                              [facility] Rec #: [id number]                                        [id number]    Patient Location: [place]/             Procedure: KNEE LIMITED 1 or 2 VIEWS    REQUISITION #: [id number]      REPORT #: [id number]           DATE OF EXAM: 22    TIME OF EXAM: 1236       Frontal and lateral views of the bilateral knees.         HISTORY:  Arthritis.        COMPARISON: No prior similar studies are available for comparison.        FINDINGS:        No fracture or dislocation.  No joint effusion.        Mild degenerative changes with osteophytes, joint space narrowing, and   subchondral sclerosis.    Sclerotic lesion within the distal right femur.        The surrounding soft tissues are unremarkable. No radiopaque foreign body.         IMPRESSION:        1. Mild bilateral knee osteoarthritis.    2. Sclerotic lesion within the distal right femur likely represents an   enchondroma. Does the patient have prior imaging available for comparison?   Consider MRI with contrast if further evaluation is clinically warranted.        DICTATING PHYSICIAN:  ANA MARÍA OLIVO MD                   Date Dictated: 22        Signed By:  ANA MARÍA OLIVO MD <Electronically signed by ANA MARÍA OLIVO MD in    OV>    Date Signed:  22     CC: RONEN CASTRO MD ; RONEN CASTRO MD      ADMITTING PHYSICIAN:                                                                                                    ORDERING PHY: RONEN CASTRO MD                                                                                                                                                      ATTENDING PHY: RONEN CASTRO MD    Patient Status:  REG CLI    Admit Service Date: 22       [facility] UNKNOWN RAD EAP                                RADIOLOGY REPORT        PT NAME: MUNA RAWLS      [facility]     : 1965 F 56             [address]    ACCT: [id number]                                              [facility] Rec #: [id number]                                        [id number]    Patient Location: [place]RAD/             Procedure: HAND COMPLETE MIN 3V    REQUISITION #: [id number]      REPORT #: [id number]           DATE OF EXAM: 22    TIME OF EXAM: 1235       Technique: Frontal, oblique, and lateral views of the  bilateral hands.        Clinical history: Arthritis.        COMPARISON: None.        FINDINGS:        No fracture or dislocation. Mild degenerative changes of the bilateral hands   with osteophytes, joint space narrowing, and subchondral sclerosis primarily   at the DIP and PIP joints, first metacarpophalangeal joint. Soft tissues are   unremarkable. No radiopaque foreign body.        IMPRESSION:        Mild osteoarthritis.                DICTATING PHYSICIAN:  ANA MARÍA OLIVO MD                   Date Dictated: 22 1408        Signed By:  ANA MARÍA OLIVO MD <Electronically signed by ANA MARÍA OLIVO MD in    OV>    Date Signed:  22     CC: RONEN CASTRO MD ; RONEN CASTRO MD      ADMITTING PHYSICIAN:                                                                                                    ORDERING PHY: RONEN CASTRO MD                                                                                                                                                      ATTENDING PHY: RONEN CASTRO MD    Patient Status:  REG CLI    Admit Service Date: 22       [facility] UNKNOWN RAD EAP                                RADIOLOGY REPORT        PT NAME: MUNA RAWLS      [facility]     : 1965 F 56             [address]    ACCT: [id number]                                              [facility] Rec #: [id number]                                        [id number]    Patient Location: [place].RAD/             Procedure: HAND COMPLETE MIN 3V    REQUISITION #: [id number]      REPORT #: [id number]           DATE OF EXAM: 22    TIME OF EXAM: 1235       Technique: Frontal, oblique, and lateral views of the bilateral hands.        Clinical history: Arthritis.        COMPARISON: None.        FINDINGS:        No fracture or dislocation. Mild degenerative changes of the bilateral hands   with osteophytes, joint space narrowing, and subchondral sclerosis primarily   at the  DIP and PIP joints, first metacarpophalangeal joint. Soft tissues are   unremarkable. No radiopaque foreign body.        IMPRESSION:        Mild osteoarthritis.                DICTATING PHYSICIAN:  ANA MARÍA OLIVO MD                   Date Dictated: 04/26/22 1408        Signed By:  ANA MARÍA OLIVO MD <Electronically signed by ANA MARÍA OLIVO MD in    OV>    Date Signed:  04/26/22 1409     CC: RONEN CASTRO MD ; RONEN CASTRO MD      ADMITTING PHYSICIAN:                                                                                                    ORDERING PHY: RONEN CASTRO MD                                                                                                                                                      ATTENDING PHY: RONEN CASTRO MD    Patient Status:  REG CLI    Admit Service Date: 04/26/22         No visits with results within 1 Year(s) from this visit.   Latest known visit with results is:   No results found for any previous visit.        All of the data above and below has been reviewed by myself and any further interpretations will be reflected in the assessment and plan.   The data includes review of external notes, and independent interpretation of lab results, x-rays, and imaging reports.  Active inflammatory arthritis is an illness that poses a threat to bodily function and even a threat to life in some cases.  Drug therapy to treat inflammatory arthritis usually requires intensive monitoring for toxicity.    Review of patient's allergies indicates:  No Known Allergies  Assessment/Plan:       Prev noted/prior plan:  (No overt synovitis but some recent prednisone noted  Diminished breath sounds but otherwise clear     I am not seeing active inflammatory arthritis or connective tissue disease on exam, but she had recent prednisone.     Verbal education and some written     Blood work to further evaluate     Add gabapentin 300mg qhs increasing to bid per response     Revisit 5  weeks   See below)     Visit prior to Last visit:     Interim lab 9/3/21:     WBC 8.5; Hgb 13.9; plt 236     Creat 0.93; alb 3.8     C3 155  C4 39     SPEP normal     LAURENT, RF, CCP not done     UA not done     HLAB27 neg Uric acid 5.5 HepBsAbsAgcoreAb neg HCV neg     CPK 76     ESR 19 CRP 7     She will consider getting lab elsewhere as some not done and they also never sent the results noted     She has some interim shoulder pain symptoms of inflammatory arthritis in feet and pain in legs     gabapentin 300mg bid      Meloxicam 15mg daily and toradol and      she should not combine choose one or other and clarify with prescribing MD as per ongoing plan     No overt synovitis but she is on prednisone 20mg for bronchitis with PCP noted  Diminished breath sounds prolonged expiration wheezing     Went over lab with her     She should consider also working with Orthopedics and Physical Medicine and Rehab MD if needed and      Verbal education     She will plan to call if problem or question or joint swelling recurs, but it sounds like mostly mechanical and some neurogenic pain     Increase gabapentin 300mg tid     Otherwise 4 months with lab 2 weeks prior     Smoking cessation counseled 3 minutes noted     Contact us prn     Last visit:     Interim lab 1/13/22:        LAURENT neg CCP neg     C3 135  C4 28     Creat 0.99 GFR 58; alb 3.2 (prior 3.8)     glc 184     WBC 10.5; Hgb 11.9 MCV plt 261     UA SG>1.03     ESR 30 CRP 3        Meloxicam 15mg daily prn and toradol and   she should not combine choose one or other and clarify with prescribing MD as per ongoing plan prev noted     gabapentin 300mg tid     She is currently finishing an antibiotic for COPD noted  Metformin added interim and she has been dieting and lost some weight     She is disabled from her COPD and thought COVID made things worse; on O2 at night; no interim prednisone     She has some interim symptoms of inflammatory arthritis; also neurogenic and  mechanical pain; including feet and hands; neck back hips knees     No overt synovitis  Diminished breath sounds but mostly clear     Went over lab      Sat and talked listened     Verbal education     Xrays to further evaluate     DEXA screening      Add vitamin D 1000 units daily if not using     Increase gabapentin 300mg qid and could be increased further      Refill meloxicam 15mg daily prn     Add tramadol 100mg tid prn #7 day supply with care and caveats so that her insurance will cover and plan for her to stretch it out as may not need everyday     Revisit with lab 2 weeks prior     Contact us prn    This visit:    Interim lab 4/15/22:    C3 127  C4 26    Creat 0.95; alb 3.9    UA SG 1.015 5-9 WBC    WBC 6.9; Hgb 12.7; plt 251    LAURENT neg dsDNA neg      ESR 30 CRP 5    Interim 4/26/2022 xray reports:    cervical spine Vertebrae and posterior elements: Normal.  Disc spaces: Normal.    Craniocervical junction: Normal.    C1 and C2: Normal.    Soft tissues: Normal.    Additional findings: None seen.    IMPRESSION:    1.  Negative examination.          lumbar spine  Limitations: No technical limitations.    COMPARISON:  No prior relevant studies.    FINDINGS:  Alignment:Normal.    Vertebral bodies and posterior elements:  1. Degenerative changes noted in the posterior facets from L3 to S1.    Disc spaces: Normal.    Soft tissues: Normal.    Additional findings: None seen.    IMPRESSION:    1.  Degenerative changes.          Hands: Mild degenerative changes of the bilateral hands with osteophytes, joint space narrowing, and subchondral sclerosis primarily at the DIP and PIP joints, first metacarpophalangeal joint. Soft tissues are unremarkable. No radiopaque foreign body.    IMPRESSION:    Mild osteoarthritis.            Hips pelvis normal    Knees: No joint effusion. Mild degenerative changes with osteophytes, joint space narrowing, and subchondral sclerosis.  Sclerotic lesion within the distal right femur.    The  surrounding soft tissues are unremarkable. IMPRESSION:    1. Mild bilateral knee osteoarthritis.  2. Sclerotic lesion within the distal right femur likely represents an enchondroma. Does the patient have prior imaging available for comparison? Consider MRI with contrast if further evaluation is clinically warranted.        right foot: mild joint space loss at the interphalangeal joints of the toes and at the first digit tarsometatarsal joint. No significant osseous erosions seen. Screws are noted in the medial  malleolus of the right ankle. Soft tissues unremarkable.    IMPRESSION:    Only mild osteoarthritic changes in the right foot. Screws noted in the medial malleolus of the right ankle.            left foot  Limitations: The images appear technically satisfactory.    COMPARISON:  No prior relevant studies are available at this time.    FINDINGS:  Osseous structures: Normal.    Joints: Normal.    Soft tissues: Normal.    Additional findings: None seen.    IMPRESSION:    1.  Negative examination.            Interim 2/18/22 DEXA: L2 t -1.9; total hip left t -1.77; left fem neck t -1.18      Prior plan Add vitamin D 1000 units daily if not using     Increase gabapentin 300mg qid and could be increased further      Refill meloxicam 15mg daily prn     Prior plan Add tramadol 100mg tid prn #7 day supply with care and caveats so that her insurance will cover and plan for her to stretch it out as may not need everyday    She did have the tramadol refilled twice interim ntoed      She has been doing fairly well;    She uses gabapentin 300mg 300mg 600mg that is working for her    Tramadol prn she uses daily sometimes bid pr    meloxicam prn    She injured her left ankle foot interim and was on crutches and walker with Urgent care and doing better    No overt synovitis    Went over interim lab DEXA xrays reports    Conservative measures including Vitamin D and adequate calcium diet for the Osteopenia for now    Consider  bisphosphonate if using steroids regularly; she has not used recently    Plan for repeat DEXA in 2025    We will refer to Orthopedics for knee pain DJD and the possible enchondroma    Increase tramadol 50mg bid prn    Cont gabapentin, mobic, vitamin D     Revisit 4 months with lab 2 weeks prior      Contact us prn        ANA1:320+ with negative specific Ab repeat LAURENT neg repeat LAURENT neg dsDNA neg RF neg CCP neg [id number] neg Uric acid 5.5 HepBsAbsAgcoreAb neg HCV neg     There is some history to suggest inflammatory arthritis.  Joint pain and swelling in knees  Neurogenic pain in legs and feet     Prev noted/prior plan:  (No overt synovitis but some recent prednisone noted  Diminished breath sounds but otherwise clear   I am not seeing active inflammatory arthritis or connective tissue disease on exam, but she had recent prednisone.   Verbal education and some written   Blood work to further evaluate  Add gabapentin 300mg qhs increasing to bid per response  Revisit 5 weeks   See below)     She tells me when she has joint pain and swelling,   she will have skin changes and bumps in the palm of her hands prior to the onset;   she will plan to have evaluation with Dermatology and see if she can also take a picture when recurs     There is also some mechanical and neurogenic pain.     Neuropathy per records     Carpal tunnel surgery     ankle surgery     severe COPD; steroid dependent; chronic hypercapnic respiratory failure; history COVID infection; history recurrent Pneumonias;      Has been managing with:     Meloxicam 15mg daily     Toradol she should not combine with meloxicam which she uses meloxicam     Prednisone 20mg bid 7 days given weeks prior to initial visit     requip      adderall noted     Citalopram      she rather clarifies she no longer uses Lexapro noted     Albuterol     lipitor     She tells me she also has diabetes and had been on insulin in the past noted     Has been working  with:     Severe COPD working with Pulmonary     Records PCP Saulo LOMELI some of most recent note 7/1/21: HPI cough and wheeze; A/P: COPD acute respiratory infection; LAURENT+: Prednisone 20mg bid for 7 days; levofloxacin 500mg daily 7 days     Records Dr. Leblanc 6/26/21: COPD likely severe; chronic hypercapnic respiratory failure; history COVID infection; history recurrent Pneumonias; active tobacco use; obtain CXR and PFTs from PCP or her Pulmonologist; cont inhaler with trelegy for maintenance therapy and albuterol nebulizer treatments; review ABG results from recent hospitalization and assess need for trilogy machine. F/u 1 month     Records Payal CHUNG some of recent note 3/24/21: Hospitalized 12/2020 [facility] for sepsis and bladder infection; completed antibiotics and symptoms resolved. Impression: chronic respiratory failure; severe COPD; steroid dependent; levaquin and prednisone taper; CT chest; cont chatix; revisit 6-8 weeks     Review of medical records as stated above.  Lab +/- imaging and other ordered diagnostic studies to further evaluate.  See the orders associated with this note visit.  Medications as prescribed as tolerated.    Education including verbal and those noted in the patient instructions.  Revisit as scheduled and call prn.    The following diagnoses influence medical decision making and/or need further workup including the orders listed below:    Pain in other joint  -     LAURENT by IFA, w/Rflx; Future; Expected date: 08/08/2022  -     Comprehensive Metabolic Panel; Future; Expected date: 08/08/2022  -     C-Reactive Protein; Future; Expected date: 08/08/2022  -     C3 Complement; Future; Expected date: 08/08/2022  -     C4 Complement; Future; Expected date: 08/08/2022  -     CBC Auto Differential; Future; Expected date: 08/08/2022  -     Urinalysis Microscopic; Future; Expected date: 08/08/2022  -     Sedimentation rate; Future; Expected date: 08/08/2022  -      CK; Future; Expected date: 08/08/2022  -     traMADoL (ULTRAM) 50 mg tablet; Take 1 tablet (50 mg total) by mouth 2 (two) times daily as needed (moderate to severe pain).  Dispense: 60 tablet; Refill: 4  -     meloxicam (MOBIC) 15 MG tablet; Take 1 tablet (15 mg total) by mouth daily as needed for Pain.  Dispense: 30 tablet; Refill: 4    Polyarthralgia  -     LAURENT by IFA, w/Rflx; Future; Expected date: 08/08/2022  -     Comprehensive Metabolic Panel; Future; Expected date: 08/08/2022  -     C-Reactive Protein; Future; Expected date: 08/08/2022  -     C3 Complement; Future; Expected date: 08/08/2022  -     C4 Complement; Future; Expected date: 08/08/2022  -     CBC Auto Differential; Future; Expected date: 08/08/2022  -     Urinalysis Microscopic; Future; Expected date: 08/08/2022  -     Sedimentation rate; Future; Expected date: 08/08/2022  -     CK; Future; Expected date: 08/08/2022  -     traMADoL (ULTRAM) 50 mg tablet; Take 1 tablet (50 mg total) by mouth 2 (two) times daily as needed (moderate to severe pain).  Dispense: 60 tablet; Refill: 4  -     meloxicam (MOBIC) 15 MG tablet; Take 1 tablet (15 mg total) by mouth daily as needed for Pain.  Dispense: 30 tablet; Refill: 4    Neuropathy  -     LAURENT by IFA, w/Rflx; Future; Expected date: 08/08/2022  -     Comprehensive Metabolic Panel; Future; Expected date: 08/08/2022  -     C-Reactive Protein; Future; Expected date: 08/08/2022  -     C3 Complement; Future; Expected date: 08/08/2022  -     C4 Complement; Future; Expected date: 08/08/2022  -     CBC Auto Differential; Future; Expected date: 08/08/2022  -     Urinalysis Microscopic; Future; Expected date: 08/08/2022  -     Sedimentation rate; Future; Expected date: 08/08/2022  -     CK; Future; Expected date: 08/08/2022  -     traMADoL (ULTRAM) 50 mg tablet; Take 1 tablet (50 mg total) by mouth 2 (two) times daily as needed (moderate to severe pain).  Dispense: 60 tablet; Refill: 4  -     gabapentin (NEURONTIN) 300  MG capsule; Take 1 capsule (300 mg total) by mouth 4 (four) times daily.  Dispense: 120 capsule; Refill: 4    Other osteoarthritis involving multiple joints  -     LAURENT by michelle DUNN/Rflx; Future; Expected date: 08/08/2022  -     Comprehensive Metabolic Panel; Future; Expected date: 08/08/2022  -     C-Reactive Protein; Future; Expected date: 08/08/2022  -     C3 Complement; Future; Expected date: 08/08/2022  -     C4 Complement; Future; Expected date: 08/08/2022  -     CBC Auto Differential; Future; Expected date: 08/08/2022  -     Urinalysis Microscopic; Future; Expected date: 08/08/2022  -     Sedimentation rate; Future; Expected date: 08/08/2022  -     CK; Future; Expected date: 08/08/2022  -     traMADoL (ULTRAM) 50 mg tablet; Take 1 tablet (50 mg total) by mouth 2 (two) times daily as needed (moderate to severe pain).  Dispense: 60 tablet; Refill: 4  -     meloxicam (MOBIC) 15 MG tablet; Take 1 tablet (15 mg total) by mouth daily as needed for Pain.  Dispense: 30 tablet; Refill: 4  -     Ambulatory referral/consult to Orthopedics; Future; Expected date: 05/04/2022    Enchondroma of femur, right  -     Ambulatory referral/consult to Orthopedics; Future; Expected date: 05/04/2022    Chronic obstructive pulmonary disease, unspecified COPD type  -     LAURENT by michelle DUNN/Rflx; Future; Expected date: 08/08/2022  -     Comprehensive Metabolic Panel; Future; Expected date: 08/08/2022  -     C-Reactive Protein; Future; Expected date: 08/08/2022  -     C3 Complement; Future; Expected date: 08/08/2022  -     C4 Complement; Future; Expected date: 08/08/2022  -     CBC Auto Differential; Future; Expected date: 08/08/2022  -     Urinalysis Microscopic; Future; Expected date: 08/08/2022  -     Sedimentation rate; Future; Expected date: 08/08/2022  -     CK; Future; Expected date: 08/08/2022    Osteopenia of multiple sites  -     cholecalciferol, vitamin D3, (VITAMIN D3) 25 mcg (1,000 unit) capsule; Take 1 capsule (1,000 Units total)  by mouth once daily.  Dispense: 30 capsule; Refill: 12    Long term (current) use of systemic steroids    Screening for rheumatic disorder  -     LAURENT by IFA, w/Rflx; Future; Expected date: 08/08/2022  -     Comprehensive Metabolic Panel; Future; Expected date: 08/08/2022  -     C-Reactive Protein; Future; Expected date: 08/08/2022  -     C3 Complement; Future; Expected date: 08/08/2022  -     C4 Complement; Future; Expected date: 08/08/2022  -     CBC Auto Differential; Future; Expected date: 08/08/2022  -     Urinalysis Microscopic; Future; Expected date: 08/08/2022  -     Sedimentation rate; Future; Expected date: 08/08/2022  -     CK; Future; Expected date: 08/08/2022    Screening for osteoporosis    Chronic pain of both knees  -     Ambulatory referral/consult to Orthopedics; Future; Expected date: 05/04/2022      Follow up in about 4 months (around 8/27/2022).     Zachery Brooks MD

## 2022-04-26 RX ORDER — TRAMADOL HYDROCHLORIDE 100 MG/1
100 TABLET, COATED ORAL 3 TIMES DAILY
COMMUNITY
End: 2022-04-26 | Stop reason: SDUPTHER

## 2022-04-26 RX ORDER — TRAMADOL HYDROCHLORIDE 100 MG/1
100 TABLET, COATED ORAL 3 TIMES DAILY PRN
Qty: 42 TABLET | Refills: 0 | Status: SHIPPED | OUTPATIENT
Start: 2022-04-26 | End: 2022-04-27

## 2022-04-27 ENCOUNTER — OFFICE VISIT (OUTPATIENT)
Dept: RHEUMATOLOGY | Facility: CLINIC | Age: 57
End: 2022-04-27
Payer: MEDICAID

## 2022-04-27 VITALS
SYSTOLIC BLOOD PRESSURE: 111 MMHG | HEIGHT: 64 IN | OXYGEN SATURATION: 98 % | DIASTOLIC BLOOD PRESSURE: 64 MMHG | BODY MASS INDEX: 26.46 KG/M2 | HEART RATE: 56 BPM | WEIGHT: 155 LBS | RESPIRATION RATE: 15 BRPM

## 2022-04-27 DIAGNOSIS — M15.8 OTHER OSTEOARTHRITIS INVOLVING MULTIPLE JOINTS: ICD-10-CM

## 2022-04-27 DIAGNOSIS — M25.59 PAIN IN OTHER JOINT: Primary | ICD-10-CM

## 2022-04-27 DIAGNOSIS — G62.9 NEUROPATHY: ICD-10-CM

## 2022-04-27 DIAGNOSIS — M25.561 CHRONIC PAIN OF BOTH KNEES: ICD-10-CM

## 2022-04-27 DIAGNOSIS — Z79.52 LONG TERM (CURRENT) USE OF SYSTEMIC STEROIDS: ICD-10-CM

## 2022-04-27 DIAGNOSIS — Z13.89 SCREENING FOR RHEUMATIC DISORDER: ICD-10-CM

## 2022-04-27 DIAGNOSIS — M25.50 POLYARTHRALGIA: ICD-10-CM

## 2022-04-27 DIAGNOSIS — Z13.820 SCREENING FOR OSTEOPOROSIS: ICD-10-CM

## 2022-04-27 DIAGNOSIS — D16.21 ENCHONDROMA OF FEMUR, RIGHT: ICD-10-CM

## 2022-04-27 DIAGNOSIS — M85.89 OSTEOPENIA OF MULTIPLE SITES: ICD-10-CM

## 2022-04-27 DIAGNOSIS — G89.29 CHRONIC PAIN OF BOTH KNEES: ICD-10-CM

## 2022-04-27 DIAGNOSIS — M25.562 CHRONIC PAIN OF BOTH KNEES: ICD-10-CM

## 2022-04-27 DIAGNOSIS — J44.9 CHRONIC OBSTRUCTIVE PULMONARY DISEASE, UNSPECIFIED COPD TYPE: ICD-10-CM

## 2022-04-27 PROCEDURE — 99214 OFFICE O/P EST MOD 30 MIN: CPT | Mod: S$GLB,,, | Performed by: INTERNAL MEDICINE

## 2022-04-27 PROCEDURE — 3078F PR MOST RECENT DIASTOLIC BLOOD PRESSURE < 80 MM HG: ICD-10-PCS | Mod: CPTII,S$GLB,, | Performed by: INTERNAL MEDICINE

## 2022-04-27 PROCEDURE — 3008F PR BODY MASS INDEX (BMI) DOCUMENTED: ICD-10-PCS | Mod: CPTII,S$GLB,, | Performed by: INTERNAL MEDICINE

## 2022-04-27 PROCEDURE — 3074F SYST BP LT 130 MM HG: CPT | Mod: CPTII,S$GLB,, | Performed by: INTERNAL MEDICINE

## 2022-04-27 PROCEDURE — 3008F BODY MASS INDEX DOCD: CPT | Mod: CPTII,S$GLB,, | Performed by: INTERNAL MEDICINE

## 2022-04-27 PROCEDURE — 1160F RVW MEDS BY RX/DR IN RCRD: CPT | Mod: CPTII,S$GLB,, | Performed by: INTERNAL MEDICINE

## 2022-04-27 PROCEDURE — 1159F MED LIST DOCD IN RCRD: CPT | Mod: CPTII,S$GLB,, | Performed by: INTERNAL MEDICINE

## 2022-04-27 PROCEDURE — 1160F PR REVIEW ALL MEDS BY PRESCRIBER/CLIN PHARMACIST DOCUMENTED: ICD-10-PCS | Mod: CPTII,S$GLB,, | Performed by: INTERNAL MEDICINE

## 2022-04-27 PROCEDURE — 99214 PR OFFICE/OUTPT VISIT, EST, LEVL IV, 30-39 MIN: ICD-10-PCS | Mod: S$GLB,,, | Performed by: INTERNAL MEDICINE

## 2022-04-27 PROCEDURE — 1159F PR MEDICATION LIST DOCUMENTED IN MEDICAL RECORD: ICD-10-PCS | Mod: CPTII,S$GLB,, | Performed by: INTERNAL MEDICINE

## 2022-04-27 PROCEDURE — 3074F PR MOST RECENT SYSTOLIC BLOOD PRESSURE < 130 MM HG: ICD-10-PCS | Mod: CPTII,S$GLB,, | Performed by: INTERNAL MEDICINE

## 2022-04-27 PROCEDURE — 3078F DIAST BP <80 MM HG: CPT | Mod: CPTII,S$GLB,, | Performed by: INTERNAL MEDICINE

## 2022-04-27 RX ORDER — TRAMADOL HYDROCHLORIDE 50 MG/1
50 TABLET ORAL 2 TIMES DAILY PRN
Qty: 60 TABLET | Refills: 4 | Status: SHIPPED | OUTPATIENT
Start: 2022-04-27 | End: 2022-10-05 | Stop reason: SDUPTHER

## 2022-04-27 RX ORDER — MELOXICAM 15 MG/1
15 TABLET ORAL DAILY PRN
Qty: 30 TABLET | Refills: 4 | Status: SHIPPED | OUTPATIENT
Start: 2022-04-27 | End: 2022-12-20 | Stop reason: SDUPTHER

## 2022-04-27 RX ORDER — VIT C/E/ZN/COPPR/LUTEIN/ZEAXAN 250MG-90MG
1000 CAPSULE ORAL DAILY
Qty: 30 CAPSULE | Refills: 12 | Status: SHIPPED | OUTPATIENT
Start: 2022-04-27 | End: 2022-12-20 | Stop reason: SDUPTHER

## 2022-04-27 RX ORDER — GABAPENTIN 300 MG/1
300 CAPSULE ORAL 4 TIMES DAILY
Qty: 120 CAPSULE | Refills: 4 | Status: SHIPPED | OUTPATIENT
Start: 2022-04-27 | End: 2022-12-20 | Stop reason: SDUPTHER

## 2022-04-27 RX ORDER — BUTALBITAL, ACETAMINOPHEN AND CAFFEINE 300; 40; 50 MG/1; MG/1; MG/1
CAPSULE ORAL
COMMUNITY
Start: 2022-03-30

## 2022-10-05 DIAGNOSIS — G62.9 NEUROPATHY: ICD-10-CM

## 2022-10-05 DIAGNOSIS — M25.50 POLYARTHRALGIA: ICD-10-CM

## 2022-10-05 DIAGNOSIS — M15.8 OTHER OSTEOARTHRITIS INVOLVING MULTIPLE JOINTS: ICD-10-CM

## 2022-10-05 DIAGNOSIS — M25.59 PAIN IN OTHER JOINT: ICD-10-CM

## 2022-10-05 RX ORDER — TRAMADOL HYDROCHLORIDE 50 MG/1
50 TABLET ORAL 2 TIMES DAILY PRN
Qty: 60 TABLET | Refills: 1 | Status: SHIPPED | OUTPATIENT
Start: 2022-10-05 | End: 2022-12-20 | Stop reason: SDUPTHER

## 2022-11-16 ENCOUNTER — TELEPHONE (OUTPATIENT)
Dept: RHEUMATOLOGY | Facility: CLINIC | Age: 57
End: 2022-11-16
Payer: MEDICAID

## 2022-11-16 NOTE — TELEPHONE ENCOUNTER
Spoke with patient. Told her I faxed the lab orders to [facility]. She said she will go do them the first week of December.

## 2022-12-06 ENCOUNTER — TELEPHONE (OUTPATIENT)
Dept: RHEUMATOLOGY | Facility: CLINIC | Age: 57
End: 2022-12-06
Payer: MEDICAID

## 2022-12-06 NOTE — PROGRESS NOTES
Subjective:      Patient ID: Romi Estrella is a 57 y.o. female.    Chief Complaint: Disease Management    HPI:   Includes joint pain with subjective swelling.   Antecedent event includes: None;  Pain location includes: widespread and diffuse and joints;  Gradual onset; beginning >years ago;  Constant ache, Moderate in severity,   Lasting >minutes, Worse at all times;   Improved with rest, medication, change in position, and none;   Worsened with activity, overuse, stress, and rest;         Review of Systems   Constitutional:  Positive for fatigue. Negative for chills and fever.   HENT:  Negative for nasal congestion, ear pain, hearing loss, mouth dryness, mouth sores, nosebleeds and trouble swallowing.    Eyes:  Negative for photophobia, pain, redness, visual disturbance and eye dryness.   Respiratory:  Positive for shortness of breath. Negative for cough.    Cardiovascular:  Positive for leg swelling. Negative for chest pain and palpitations.   Gastrointestinal:  Negative for abdominal distention, abdominal pain, blood in stool, constipation, diarrhea, rectal pain, vomiting and fecal incontinence.   Endocrine: Negative for polydipsia and polyuria.   Genitourinary:  Negative for bladder incontinence, dysuria, enuresis, genital sores and hematuria.   Musculoskeletal:  Positive for arthralgias, joint swelling and neck pain. Negative for myalgias.   Integumentary:  Negative for rash, wound and mole/lesion.   Neurological:  Positive for dizziness and headaches. Negative for weakness.   Hematological:  Negative for adenopathy. Bruises/bleeds easily.   Psychiatric/Behavioral:  Positive for sleep disturbance. Negative for dysphoric mood. The patient is nervous/anxious.     Past Medical History:   Diagnosis Date    Acid reflux     Anxiety     Asthma     Depression     Hyperlipidemia     Hypertension     Neuropathy     Tachycardia      Past Surgical History:   Procedure Laterality Date    ANKLE SURGERY      APPENDECTOMY       CARPAL TUNNEL RELEASE      CATARACT EXTRACTION      CHOLECYSTECTOMY      DIVERTICULECTOMY      HYSTERECTOMY        See the Assessment/Plan for further characterization of the HPI, ROS, Medical, Surgical, Family, and Social Histories including Tobacco use, Meds; all of which has been reviewed in Epic.    Medication List with Changes/Refills   Current Medications    ALBUTEROL (PROVENTIL) 2.5 MG /3 ML (0.083 %) NEBULIZER SOLUTION    use one vial in nebulizer four times a day as needed    ALBUTEROL (PROVENTIL/VENTOLIN HFA) 90 MCG/ACTUATION INHALER    Inhale 1 puff into the lungs.    ATORVASTATIN (LIPITOR) 40 MG TABLET    Take 40 mg by mouth once daily.    BUTALBITAL-ACETAMINOPHEN-CAFF -40 MG CAP    TAKE ONE CAPSULE BY MOUTH EVERY 6 HOURS AS NEEDED FOR HEADACHE    CARVEDILOL (COREG) 3.125 MG TABLET        CHANTIX CONTINUING MONTH BOX 1 MG TAB    Take 1 mg by mouth 2 (two) times daily.    CITALOPRAM (CELEXA) 40 MG TABLET    Take 40 mg by mouth every morning.    CLONAZEPAM (KLONOPIN) 1 MG TABLET    Take 1 mg by mouth.    DEXTROAMPHETAMINE-AMPHETAMINE 30 MG TAB    Take 30 mg by mouth.    ESCITALOPRAM OXALATE (LEXAPRO) 20 MG TABLET    Take 20 mg by mouth.    ESTRADIOL (ESTRACE) 1 MG TABLET    Take 1 mg by mouth.    FAMOTIDINE (PEPCID) 20 MG TABLET    Take 20 mg by mouth 2 (two) times daily.    FLUTICASONE PROPIONATE (FLONASE) 50 MCG/ACTUATION NASAL SPRAY    50 mcg by Nasal route.    LISINOPRIL 10 MG TABLET    Take 10 mg by mouth once daily.    NICOTINE (NICODERM CQ) 14 MG/24 HR    Place 1 patch onto the skin.    ROPINIROLE (REQUIP) 0.5 MG TABLET        TRELEGY ELLIPTA 200-62.5-25 MCG INHALER    inhale one puff by mouth once a day. rinse mouth after each dose    TRULICITY 0.75 MG/0.5 ML PEN INJECTOR    inject 0.5 ML UNDER SKIN ONCE a WEEK   Changed and/or Refilled Medications    Modified Medication Previous Medication    CHOLECALCIFEROL, VITAMIN D3, (VITAMIN D3) 25 MCG (1,000 UNIT) CAPSULE cholecalciferol, vitamin  "D3, (VITAMIN D3) 25 mcg (1,000 unit) capsule       Take 1 capsule (1,000 Units total) by mouth once daily.    Take 1 capsule (1,000 Units total) by mouth once daily.    GABAPENTIN (NEURONTIN) 300 MG CAPSULE gabapentin (NEURONTIN) 300 MG capsule       Take 2 capsules (600 mg total) by mouth 3 (three) times daily.    Take 1 capsule (300 mg total) by mouth 4 (four) times daily.    MELOXICAM (MOBIC) 15 MG TABLET meloxicam (MOBIC) 15 MG tablet       Take 1 tablet (15 mg total) by mouth daily as needed for Pain.    Take 1 tablet (15 mg total) by mouth daily as needed for Pain.    TRAMADOL (ULTRAM) 50 MG TABLET traMADoL (ULTRAM) 50 mg tablet       Take 1 tablet (50 mg total) by mouth 2 (two) times daily as needed (moderate to severe pain).    Take 1 tablet (50 mg total) by mouth 2 (two) times daily as needed (moderate to severe pain).   Discontinued Medications    ADVAIR -21 MCG/ACTUATION HFAA INHALER    Inhale 1 puff into the lungs 2 (two) times daily.    METFORMIN (GLUCOPHAGE) 500 MG TABLET    Take 500 mg by mouth 2 (two) times daily.         Objective:   /72   Pulse 60   Resp 18   Ht 5' 4" (1.626 m)   Wt 72.1 kg (158 lb 14.4 oz)   SpO2 98%   BMI 27.28 kg/m²   Physical Exam  Non-toxic appearance. No distress.   Normocephalic and atraumatic.   External ears normal.    Conjunctivae and EOM are normal. No scleral icterus.   OP clear, moist.  No salivary gland enlargement or tenderness.  No submental, no submandibular, no preauricular, nor cervical adenopathy.   No JVD. No carotid bruit. No thyromegaly.   RRR, No friction rub; palpable distal pulses.    No tachypnea or signs of respiratory distress.   Abdominal: No guarding or rebound tenderness.   Neurological: Oriented. Normal thought content. No cranial nerve deficit. Strength is grossly normal without focal deficit.  Skin is warm. No pallor.   Musculoskeletal: DJD. see below for further input. No overt synovitis.     [facility] UNKNOWN RAD EAP                "                 RADIOLOGY REPORT        [facility]     : 1965 F 56             [address]    ACCT: [id number]                                              [place], [place]    [facility] Rec #: [id number]                                        [id number]    Patient Location[place].RAD/             Procedure: FOOT LIMITED 2 VIEWS    REQUISITION #: [id number]      REPORT #: [id number]           DATE OF EXAM: 22    TIME OF EXAM: 1236       Technique: Frontal, oblique, and lateral views of the right foot.        Clinical history: Arthritis.        COMPARISON: None.        FINDINGS:        No fracture or dislocation. Family mild joint space loss at the   interphalangeal joints of the toes and at the first digit tarsometatarsal   joint. No significant osseous erosions seen. Screws are noted in the medial    malleolus of the right ankle. Soft tissues unremarkable.        IMPRESSION:        Only mild osteoarthritic changes in the right foot. Screws noted in the   medial malleolus of the right ankle.                DICTATING PHYSICIAN:  LAST HOFFMAN MD                   Date Dictated: 22 1413        Signed By:  LAST HOFFMAN MD <Electronically signed by LAST HOFFMAN MD in    OV>    Date Signed:  22 141     CC: RONEN CASTRO MD ; RONEN CASTRO MD      ADMITTING PHYSICIAN:                                                                                                    ORDERING PHY: RONEN CASTRO MD                                                                                                                                                      ATTENDING PHY: RONEN CASTRO MD    Patient Status:  REG CLI    Admit Service Date: 22       X-Ray Lumbar Spine 2 Or 3 Views                                RADIOLOGY REPORT        [facility]     : 1965 F 56             [address]    ACCT: [id number]                                               [facility] Rec #: [id number]                                        [id number]    [place]/             Procedure: SPINE LUMBSAC LM 2V OR 3V    REQUISITION #: [id number]      REPORT #: [id number]           DATE OF EXAM: 22    TIME OF EXAM: 1237       CLINICAL HISTORY:    Arthritis        TECHNIQUE:    Views: 3 views of the lumbar spine    Limitations: No technical limitations.        COMPARISON:    No prior relevant studies.        FINDINGS:    Alignment:Normal.        Vertebral bodies and posterior elements:    1. Degenerative changes noted in the posterior facets from L3 to S1.        Disc spaces: Normal.        Soft tissues: Normal.        Additional findings: None seen.        IMPRESSION:        1.  Degenerative changes.            This document was created using a voice recognition transcribing system.   Incorrect words or phrases may have been missed during proof reading. Please   interpret accordingly or contact the radiologist for clarification if   necessary.        DICTATING PHYSICIAN:  GERMÁN CERRATO MD                   Date Dictated: 22        Signed By:  GERMÁN CERRATO MD <Electronically signed by GERMÁN CERRATO MD in OV>    Date Signed:  22     CC: RONEN CASTRO MD ; RONEN CASTRO MD      ADMITTING PHYSICIAN:                                                                                                    ORDERING PHY: RONEN CASTRO MD                                                                                                                                                      ATTENDING PHY: RONEN CASTRO MD    Patient Status:  REG CLI    Admit Service Date: 22       X-Ray Cervical Spine 2 or 3 Views                                RADIOLOGY REPORT        PT NAME: MUNA RAWLS      [facility]     : 1965 F 56             [address]    ACCT:  [id number]                                              [facility] Rec #: [id number]                                        [id number]    [place]/             Procedure: SPINE CERV 2V or 3V    REQUISITION #: [id number]      REPORT #: [id number]           DATE OF EXAM: 22    TIME OF EXAM: 1235       CLINICAL DATA:    Arthritis        TECHNIQUE:    Views: 3 views of the cervical spine were obtained.    Limitations: The images are technically satisfactory.        COMPARISON:    No prior relevant studies are available.        FINDINGS:    Alignment: Normal.        Vertebrae and posterior elements: Normal.        Disc spaces: Normal.        Craniocervical junction: Normal.        C1 and C2: Normal.        Soft tissues: Normal.        Additional findings: None seen.        IMPRESSION:        1.  Negative examination.            This document was created using a voice recognition transcribing system.   Incorrect words or phrases may have been missed during proof reading. Please   interpret accordingly or contact the radiologist for clarification if   necessary.        DICTATING PHYSICIAN:  GERMÁN CERRATO MD                   Date Dictated: 22        Signed By:  GERMÁN CERRATO MD <Electronically signed by GERMÁN CERRATO MD in OV>    Date Signed:  22     CC: RONEN CASTRO MD ; RONEN CASTRO MD      ADMITTING PHYSICIAN:                                                                                                    ORDERING PHY: RONEN CASTRO MD                                                                                                                                                      ATTENDING PHY: RONEN CASTRO MD    Patient Status:  REG CLI    Admit Service Date: 22       X-Ray Hips Bilateral 2 View Inc AP Pelvis                                RADIOLOGY REPORT        PT NAME: MUNA RAWLS      [facility]     :  1965 F 56             [address]    ACCT: [id number]                                              [facility] Rec #: [id number]                                        [id number]    [place]/             Procedure: HIPS CASE MIN 2 V W PELVIS    REQUISITION #: [id number]      REPORT #: [id number]           DATE OF EXAM: 22    TIME OF EXAM: 1236       Clinical data:    Arthritis        Technique:    Views: A single view the pelvis and 2 views of each hip    Limitations: The images are technically satisfactory.        Comparison:    No prior relevant studies are available.        Findings:    No osseous, articular or soft tissue abnormality seen.        Impression:        1.  Negative exam.            This document was created using a voice recognition transcribing system.   Incorrect words or phrases may have been missed during proof reading. Please   interpret accordingly or contact the radiologist for clarification if   necessary.        DICTATING PHYSICIAN:  GERMÁN CERRATO MD                   Date Dictated: 22        Signed By:  GERMÁN CERRATO MD <Electronically signed by GERMÁN CERRATO MD in OV>    Date Signed:  22     CC: RONEN CASTRO MD ; RONEN CASTRO MD      ADMITTING PHYSICIAN:                                                                                                    ORDERING PHY: RONEN CASTRO MD                                                                                                                                                      ATTENDING PHY: RONEN CASTRO MD    Patient Status:  REG CLI    Admit Service Date: 22       [facility] UNKNOWN RAD EAP                                RADIOLOGY REPORT        PT NAME: MUNA RAWLS [facility]     : 1965 F 56             [address]    ACCT: [id number]                                              [facility] Rec #:  [id number]                                        [id number]    [place]/             Procedure: FOOT LIMITED 2 VIEWS    REQUISITION #: [id number]      REPORT #: [id number]           DATE OF EXAM: 22    TIME OF EXAM: 1236       PROCEDURE: FOOT LIMITED 2 VIEWS Left        CLINICAL DATA:    Arthritis        TECHNIQUE:    Views: 3 views of the left foot    Limitations: The images appear technically satisfactory.        COMPARISON:    No prior relevant studies are available at this time.        FINDINGS:    Osseous structures: Normal.        Joints: Normal.        Soft tissues: Normal.        Additional findings: None seen.        IMPRESSION:        1.  Negative examination.            This document was created using a voice recognition transcribing system.   Incorrect words or phrases may have been missed during proof reading. Please   interpret accordingly or contact the radiologist for clarification if   necessary.        DICTATING PHYSICIAN:  GERMÁN CERRATO MD                   Date Dictated: 22        Signed By:  GERMÁN CERRATO MD <Electronically signed by GERMÁN CERRATO MD in OV>    Date Signed:  22     CC: RONEN CASTRO MD ; RONEN CASTRO MD      ADMITTING PHYSICIAN:                                                                                                    ORDERING PHY: RONEN CASTRO MD                                                                                                                                                      ATTENDING PHY: RONEN CASTRO MD    Patient Status:  REG CLI    Admit Service Date: 22       [facility] UNKNOWN RAD EAP                                RADIOLOGY REPORT        PT NAME: MUNA RAWLS [facility]     : 1965 F 56             [address]    ACCT: [id number]                                              [place], [place]    Med Rec #: [id number]                                         [address]    [place]/             Procedure: KNEE LIMITED 1 or 2 VIEWS    REQUISITION #: [id number]      REPORT #: [id number]           DATE OF EXAM: 22    TIME OF EXAM: 1236       Frontal and lateral views of the bilateral knees.        HISTORY:  Arthritis.        COMPARISON: No prior similar studies are available for comparison.        FINDINGS:        No fracture or dislocation.  No joint effusion.        Mild degenerative changes with osteophytes, joint space narrowing, and   subchondral sclerosis.    Sclerotic lesion within the distal right femur.        The surrounding soft tissues are unremarkable. No radiopaque foreign body.         IMPRESSION:        1. Mild bilateral knee osteoarthritis.    2. Sclerotic lesion within the distal right femur likely represents an   enchondroma. Does the patient have prior imaging available for comparison?   Consider MRI with contrast if further evaluation is clinically warranted.        DICTATING PHYSICIAN:  ANA MARÍA OLIVO MD                   Date Dictated: 22 141        Signed By:  ANA MARÍA OLIVO MD <Electronically signed by ANA MARÍA OLIVO MD in    OV>    Date Signed:  22     CC: RONEN CASTRO MD ; RONEN CASTRO MD      ADMITTING PHYSICIAN:                                                                                                    ORDERING PHY: RONEN CASTRO MD                                                                                                                                                      ATTENDING PHY: RONEN CASTRO MD    Patient Status:  REG CLI    Admit Service Date: 22       [facility] UNKNOWN RAD EAP                                RADIOLOGY REPORT        PT NAME: MUNA RAWLS      [facility]     : 1965 F 56             [address]    ACCT: [id number]                                              [facility] Rec #: [id number]                                         [id number]    [place]/             Procedure: KNEE LIMITED 1 or 2 VIEWS    REQUISITION #: [id number]      REPORT #: [id number]           DATE OF EXAM: 22    TIME OF EXAM: 1236       Frontal and lateral views of the bilateral knees.        HISTORY:  Arthritis.        COMPARISON: No prior similar studies are available for comparison.        FINDINGS:        No fracture or dislocation.  No joint effusion.        Mild degenerative changes with osteophytes, joint space narrowing, and   subchondral sclerosis.    Sclerotic lesion within the distal right femur.        The surrounding soft tissues are unremarkable. No radiopaque foreign body.         IMPRESSION:        1. Mild bilateral knee osteoarthritis.    2. Sclerotic lesion within the distal right femur likely represents an   enchondroma. Does the patient have prior imaging available for comparison?   Consider MRI with contrast if further evaluation is clinically warranted.        DICTATING PHYSICIAN:  ANA MARÍA OLIVO MD                   Date Dictated: 22 141        Signed By:  ANA MARÍA OLIVO MD <Electronically signed by ANA MARÍA OLIVO MD in    OV>    Date Signed:  22 1413     CC: RONEN CASTRO MD ; RONEN CASTRO MD      ADMITTING PHYSICIAN:                                                                                                    ORDERING PHY: RONEN CASTRO MD                                                                                                                                                      ATTENDING PHY: RONEN CASTRO MD    Patient Status:  REG CLI    Admit Service Date: 22       [facility] UNKNOWN RAD EAP                                RADIOLOGY REPORT        PT NAME: MUNA RAWLS      [facility]     : 1965 F 56             [address]    ACCT: [id number]                                              [facility] Rec #: [id number]                                         [id number]    [place]/             Procedure: HAND COMPLETE MIN 3V    REQUISITION #: [id number]      REPORT #: [id number]           DATE OF EXAM: 22    TIME OF EXAM: 1235       Technique: Frontal, oblique, and lateral views of the bilateral hands.        Clinical history: Arthritis.        COMPARISON: None.        FINDINGS:        No fracture or dislocation. Mild degenerative changes of the bilateral hands   with osteophytes, joint space narrowing, and subchondral sclerosis primarily   at the DIP and PIP joints, first metacarpophalangeal joint. Soft tissues are   unremarkable. No radiopaque foreign body.        IMPRESSION:        Mild osteoarthritis.                DICTATING PHYSICIAN:  ANA MARÍA OLIVO MD                   Date Dictated: 22 1408        Signed By:  ANA MARÍA OLIVO MD <Electronically signed by ANA MARÍA OLIVO MD in    OV>    Date Signed:  22     CC: RONEN CASTRO MD ; RONEN CASTRO MD      ADMITTING PHYSICIAN:                                                                                                    ORDERING PHY: RONEN CASTRO MD                                                                                                                                                      ATTENDING PHY: RONEN CASTRO MD    Patient Status:  REG CLI    Admit Service Date: 22       [facility] UNKNOWN RAD EAP                                RADIOLOGY REPORT        PT NAME: MUNA RAWLS      [facility]     : 1965 F 56             [address]    ACCT: [id number]                                              [facility] Rec #: [id number]                                        [id number]    [place]/             Procedure: HAND COMPLETE MIN 3V    REQUISITION #: [id number]      REPORT #: [id number]           DATE OF EXAM: 22    TIME OF EXAM: 1235       Technique: Frontal, oblique, and lateral views of the bilateral  hands.        Clinical history: Arthritis.        COMPARISON: None.        FINDINGS:        No fracture or dislocation. Mild degenerative changes of the bilateral hands   with osteophytes, joint space narrowing, and subchondral sclerosis primarily   at the DIP and PIP joints, first metacarpophalangeal joint. Soft tissues are   unremarkable. No radiopaque foreign body.        IMPRESSION:        Mild osteoarthritis.                DICTATING PHYSICIAN:  ANA MARÍA OLIVO MD                   Date Dictated: 04/26/22 1408        Signed By:  ANA MARÍA OLIVO MD <Electronically signed by ANA MARÍA OLIVO MD in    OV>    Date Signed:  04/26/22 1409     CC: RONEN CASTRO MD ; RONEN CASTRO MD      ADMITTING PHYSICIAN:                                                                                                    ORDERING PHY: RONEN CASTRO MD                                                                                                                                                      ATTENDING PHY: RONEN CASTRO MD    Patient Status:  REG CLI    Admit Service Date: 04/26/22         Orders Only on 12/13/2022   Component Date Value Ref Range Status    C3 Complement 12/13/2022 171  90 - 180 mg/dL Final    Comment: REFERENCE INTERVAL: Complement Component 3Access complete set of age- and/or gender-specific referenceintervals for this test in the [facility] Test Directory([email or web address]).Performed By: [facility] [place] [address]   [id number] Director: Bertram Bonner MD, PhD      C4 Complement 12/13/2022 47 (A)  10 - 40 mg/dL Final    Comment: REFERENCE INTERVAL: Complement Component 4Access complete set of age- and/or gender-specific referenceintervals for this test in the [facility] Test Directory([email or web address]).Performed By: [facility] [place] [address]   [id number] Director: Bertram Bonner MD, PhD      LAURENT 12/13/2022 NONE DETECTED  None Detected Final     Comment: If suspicion of connective tissue disease is strong and LAURENT EIAis negative, consider testing for LAURENT by IFA ([id number]).No antibodies to Anti-Nuclear Antibodies (LAURENT) detected.  TheExtractable Nuclear Antigen Antibodies (RNP, Dean, SSA 52, SSA60,   Scleroderma, Mae-1 and SSB) and Double Stranded DNA (dsDNA)Antibody, IgG will not be performed.INTERPRETIVE INFORMATION: Anti-Nuclear Antibodies (LAURENT), IgG byELISAAntinuclear Antibodies (LAURENT), IgG by KAVITHA: LAURENT specimens arescreened using enzyme-linked   immunosorbent assay (KAVITHA)methodology. All KAVITHA results reported as Detected are furthertested by indirect fluorescent assay (IFA) using HEp-2 substratewith an IgG-specific conjugate. The LAURENT KAVITHA screen is designedto detect antibodies against dsDNA,   histones, SS-A ([place]), SS-B([place]), Dean, Smith/RNP, Scl-70, Mae-1, centromeric proteins,other antigens extracted from the HEp-2 cell nucleus. LAURENT ELISAassays have been reported to have lower sensitivities than ANAIFA for systemic autoimmune rheum                           atic   diseases (SARD).Negative results do not necessarily rule out SARD.Performed By: [facility] [place] [address] [id number] Director: Bertram Bonner MD, PhD     Orders Only on 12/13/2022   Component Date Value Ref Range Status    WBC 12/13/2022 6.9  4.6 - 10.2 10*3/uL Final    RBC 12/13/2022 4.20  4.2 - 5.4 10*6/uL Final    Hemoglobin 12/13/2022 12.6  12.0 - 16.0 g/dL Final    Hematocrit 12/13/2022 38.8  37.0 - 47.0 % Final    MCV 12/13/2022 92.4  80 - 97 fL Final    MCH 12/13/2022 30.0  27.0 - 31.2 pg Final    MCHC 12/13/2022 32.5  31.8 - 35.4 g/dL Final    RDW RBC Auto-Rto 12/13/2022 12.7  12.5 - 18.0 % Final    Platelets 12/13/2022 262  142 - 424 10*3/uL Final    Neutrophils 12/13/2022 55.8  37 - 80 % Final    Lymphocytes 12/13/2022 36.0  25 - 40 % Final    Monocytes 12/13/2022 6.8  1 - 15 % Final    Eosinophils 12/13/2022 0.9 (L)  1 - 3 % Final    Basophils  12/13/2022 0.4  0 - 3 % Final    nRBC# 12/13/2022 0.0  % Final    Neutrophils Absolute 12/13/2022 3.86  1.8 - 7.7 10*3/uL Final    Sed Rate 12/13/2022 22 (H)  0 - 20 mm/hr Final   Orders Only on 12/13/2022   Component Date Value Ref Range Status    Specimen Collection Method 12/13/2022 Void   Final    Color, UA 12/13/2022 Pale Yellow  Yellow Final    Appearance, UA 12/13/2022 Clear  Clear Final    pH, UA 12/13/2022 6.0  5.0 - 9.0 pH Final    Specific Gravity, UA 12/13/2022 1.010  1.000 - 1.030 Final    Protein, UA 12/13/2022 Negative  Negative mg/dL Final    Glucose, UA 12/13/2022 Normal  Normal mg/dL Final    Ketones, UA 12/13/2022 Negative  Negative mg/dL Final    Occult Blood UA 12/13/2022 Negative  Negative /uL Final    Nitrite, UA 12/13/2022 Negative  Negative Final    Bilirubin (UA) 12/13/2022 Negative  Negative mg/dL Final    Urobilinogen, UA 12/13/2022 Normal  Normal mg/dL Final    Leukocytes, UA 12/13/2022 Negative  Negative /uL Final   Orders Only on 12/13/2022   Component Date Value Ref Range Status    Sodium 12/13/2022 137  135 - 145 mmol/L Final    Potassium 12/13/2022 3.9  3.6 - 5.2 mmol/L Final    Chloride 12/13/2022 100  100 - 108 mmol/L Final    CO2 12/13/2022 30  21 - 32 mmol/L Final    Anion Gap 12/13/2022 7.0  3.0 - 11.0 mmol/L Final    BUN 12/13/2022 15  7 - 18 mg/dL Final    Creatinine 12/13/2022 1.08 (H)  0.55 - 1.02 mg/dL Final    GFR ESTIMATION 12/13/2022 53 (L)  >60 Final               DESCRIPTION       GLOMERULAR FILTRATION RATE             NORMAL                     >60             KIDNEY  DISEASE           15-60             KIDNEY FAILURE             <15    BUN/Creatinine Ratio 12/13/2022 13.88  7 - 18 Ratio Final    Glucose 12/13/2022 84  70 - 110 mg/dL Final    Calcium 12/13/2022 9.0  8.8 - 10.5 mg/dL Final    Total Bilirubin 12/13/2022 0.4  0.0 - 1.0 mg/dL Final    AST 12/13/2022 33  15 - 37 U/L Final    ALT 12/13/2022 31  12 - 78 U/L Final    Total Protein 12/13/2022 7.8  6.4 - 8.2  g/dL Final    Albumin 12/13/2022 3.8  3.4 - 5.0 g/dL Final    Globulin 12/13/2022 4.0 (H)  2.3 - 3.5 g/dL Final    Albumin/Globulin Ratio 12/13/2022 0.950 (L)  1.1 - 1.8 Ratio Final    Alkaline Phosphatase 12/13/2022 134 (H)  46 - 116 U/L Final    CK, Serum 12/13/2022 71  26 - 308 U/L Final    CRP QUANTITATIVE 12/13/2022 0.3  0.0 - 0.9 mg/dL Final   Lab Requisition on 10/03/2022   Component Date Value Ref Range Status    Urine Culture 10/03/2022 No Growth   Final   Lab Requisition on 06/20/2022   Component Date Value Ref Range Status    PT 06/20/2022 12.6  12.5 - 14.5 seconds Final    INR 06/20/2022 0.95  0.00 - 1.30 Final        All of the data above and below has been reviewed by myself and any further interpretations will be reflected in the assessment and plan.   The data includes review of external notes, and independent interpretation of lab results, x-rays, and imaging reports.  Active inflammatory arthritis is an illness that poses a threat to bodily function and even a threat to life in some cases.  Drug therapy to treat inflammatory arthritis usually requires intensive monitoring for toxicity.    Review of patient's allergies indicates:  No Known Allergies  Assessment/Plan:       Prev noted/prior plan:  (No overt synovitis but some recent prednisone noted  Diminished breath sounds but otherwise clear     I am not seeing active inflammatory arthritis or connective tissue disease on exam, but she had recent prednisone.     Verbal education and some written     Blood work to further evaluate     Add gabapentin 300mg qhs increasing to bid per response     Revisit 5 weeks   See below)     Visit prior to Visit prior to Last visit:     Interim lab 9/3/21:     WBC 8.5; Hgb 13.9; plt 236     Creat 0.93; alb 3.8     C3 155  C4 39     SPEP normal     LAURENT, RF, CCP not done     UA not done     HLAB27 neg Uric acid 5.5 HepBsAbsAgcoreAb neg HCV neg     CPK 76     ESR 19 CRP 7     She will consider getting lab elsewhere  as some not done and they also never sent the results noted     She has some interim shoulder pain symptoms of inflammatory arthritis in feet and pain in legs     gabapentin 300mg bid      Meloxicam 15mg daily and toradol and      she should not combine choose one or other and clarify with prescribing MD as per ongoing plan     No overt synovitis but she is on prednisone 20mg for bronchitis with PCP noted  Diminished breath sounds prolonged expiration wheezing     Went over lab with her     She should consider also working with Orthopedics and Physical Medicine and Rehab MD if needed and      Verbal education     She will plan to call if problem or question or joint swelling recurs, but it sounds like mostly mechanical and some neurogenic pain     Increase gabapentin 300mg tid     Otherwise 4 months with lab 2 weeks prior     Smoking cessation counseled 3 minutes noted     Contact us prn     Visit prior to Last visit:     Interim lab 1/13/22:        LAURENT neg CCP neg     C3 135  C4 28     Creat 0.99 GFR 58; alb 3.2 (prior 3.8)     glc 184     WBC 10.5; Hgb 11.9 MCV plt 261     UA SG>1.03     ESR 30 CRP 3        Meloxicam 15mg daily prn and toradol and   she should not combine choose one or other and clarify with prescribing MD as per ongoing plan prev noted     gabapentin 300mg tid     She is currently finishing an antibiotic for COPD noted  Metformin added interim and she has been dieting and lost some weight     She is disabled from her COPD and thought COVID made things worse; on O2 at night; no interim prednisone     She has some interim symptoms of inflammatory arthritis; also neurogenic and mechanical pain; including feet and hands; neck back hips knees     No overt synovitis  Diminished breath sounds but mostly clear     Went over lab      Sat and talked listened     Verbal education     Xrays to further evaluate     DEXA screening      Add vitamin D 1000 units daily if not using     Increase gabapentin 300mg  qid and could be increased further      Refill meloxicam 15mg daily prn     Add tramadol 100mg tid prn #7 day supply with care and caveats so that her insurance will cover and plan for her to stretch it out as may not need everyday     Revisit with lab 2 weeks prior     Contact us prn     Last visit:     Interim lab 4/15/22:     C3 127  C4 26     Creat 0.95; alb 3.9     UA SG 1.015 5-9 WBC     WBC 6.9; Hgb 12.7; plt 251     LAURENT neg dsDNA neg        ESR 30 CRP 5     Interim 4/26/2022 xray reports:     cervical spine Vertebrae and posterior elements: Normal.  Disc spaces: Normal.    Craniocervical junction: Normal.    C1 and C2: Normal.    Soft tissues: Normal.    Additional findings: None seen.    IMPRESSION:    1.  Negative examination.           lumbar spine  Limitations: No technical limitations.    COMPARISON:  No prior relevant studies.    FINDINGS:  Alignment:Normal.    Vertebral bodies and posterior elements:  1. Degenerative changes noted in the posterior facets from L3 to S1.    Disc spaces: Normal.    Soft tissues: Normal.    Additional findings: None seen.    IMPRESSION:    1.  Degenerative changes.           Hands: Mild degenerative changes of the bilateral hands with osteophytes, joint space narrowing, and subchondral sclerosis primarily at the DIP and PIP joints, first metacarpophalangeal joint. Soft tissues are unremarkable. No radiopaque foreign body.    IMPRESSION:    Mild osteoarthritis.             Hips pelvis normal     Knees: No joint effusion. Mild degenerative changes with osteophytes, joint space narrowing, and subchondral sclerosis.  Sclerotic lesion within the distal right femur.    The surrounding soft tissues are unremarkable. IMPRESSION:    1. Mild bilateral knee osteoarthritis.  2. Sclerotic lesion within the distal right femur likely represents an enchondroma. Does the patient have prior imaging available for comparison? Consider MRI with contrast if further evaluation is clinically  warranted.         right foot: mild joint space loss at the interphalangeal joints of the toes and at the first digit tarsometatarsal joint. No significant osseous erosions seen. Screws are noted in the medial  malleolus of the right ankle. Soft tissues unremarkable.    IMPRESSION:    Only mild osteoarthritic changes in the right foot. Screws noted in the medial malleolus of the right ankle.             left foot  Limitations: The images appear technically satisfactory.    COMPARISON:  No prior relevant studies are available at this time.    FINDINGS:  Osseous structures: Normal.    Joints: Normal.    Soft tissues: Normal.    Additional findings: None seen.    IMPRESSION:    1.  Negative examination.              Interim 2/18/22 DEXA: L2 t -1.9; total hip left t -1.77; left fem neck t -1.18        Prior plan Add vitamin D 1000 units daily if not using     Increase gabapentin 300mg qid and could be increased further      Refill meloxicam 15mg daily prn     Prior plan Add tramadol 100mg tid prn #7 day supply with care and caveats so that her insurance will cover and plan for her to stretch it out as may not need everyday     She did have the tramadol refilled twice interim ntoed        She has been doing fairly well;     She uses gabapentin 300mg 300mg 600mg that is working for her     Tramadol prn she uses daily sometimes bid pr     meloxicam prn     She injured her left ankle foot interim and was on crutches and walker with Urgent care and doing better     No overt synovitis     Went over interim lab DEXA xrays reports     Conservative measures including Vitamin D and adequate calcium diet for the Osteopenia for now     Consider bisphosphonate if using steroids regularly; she has not used recently     Plan for repeat DEXA in 2025     We will refer to Orthopedics for knee pain DJD and the possible enchondroma     Increase tramadol 50mg bid prn     Cont gabapentin, mobic, vitamin D      Revisit 4 months with lab 2  weeks prior        Contact us prn      This visit:    Interim lab:  Lab Results   Component Value Date     12/13/2022    K 3.9 12/13/2022     12/13/2022    CO2 30 12/13/2022    ANIONGAP 7.0 12/13/2022    GLU 84 12/13/2022    CALCIUM 9.0 12/13/2022    BUN 15 12/13/2022    CREATININE 1.08 (H) 12/13/2022    PROT 7.8 12/13/2022    AST 33 12/13/2022    ALT 31 12/13/2022    BILITOT 0.4 12/13/2022    ALKPHOS 134 (H) 12/13/2022    WBC 6.9 12/13/2022    NEUTROPHILS 55.8 12/13/2022    HGB 12.6 12/13/2022    MCV 92.4 12/13/2022    LABPLAT 262 12/13/2022    SEDRATE 22 (H) 12/13/2022    PROTEINUA Negative 12/13/2022         GFR 53 prior    Alb 3.8    LAURENT neg    C3 171  C4 47    ESR 22; CRP 0.3    Prev noted/prior plan:  (She has been doing fairly well;  She uses gabapentin 300mg 300mg 600mg that is working for her     Tramadol prn she uses daily sometimes bid pr     meloxicam prn     She injured her left ankle foot interim and was on crutches and walker with Urgent care and doing better     No overt synovitis     Went over interim lab DEXA xrays reports     Conservative measures including Vitamin D and adequate calcium diet for the Osteopenia for now     Consider bisphosphonate if using steroids regularly; she has not used recently     Plan for repeat DEXA in 2025     We will refer to Orthopedics for knee pain DJD and the possible enchondroma   Increase tramadol 50mg bid prn   Cont gabapentin, mobic, vitamin D )         We had planned to see her much sooner? She had missed.    She has interim symptoms of inflammatory arthritis worse in left knee    She saw Dr. Hobbs interim as per below    Looks like she have had interim problem with diverticulosis per very limited records in Epic    Yes she had diverticulosis surgery and tells me 'it was real bad' but not much other details but doing better noted    She also had cataract surgery    She has a lot of pain at rest    She has room to increase the gabapentin    No overt  synovitis    Went over lab    I am not seeing active inflammatory arthritis or connective tissue disease on exam or lab noted.    Careful with Mobic given interim GFR if using (last given in 8 months ago with 4 refills noted)    Consider MRI most symptomatic joint to further evaluate for inflammatory arthritis not over on exam or lab  She tells me her left knee is most symptomatic    Tells me she saw Dr. Hobbs who is holding off anything further for the right knee as told her the tumor has been there for some time and observing for now    MRI left knee to further evaluate for inflammatory arthritis not overt on exam   It may also show some mechanical problem that she can touch base with Dr. Hobbs also    See prior    Cont tramadol 50mg bid prn   Increase Gabapentin 600mg tid from 300mg qid per response as tolerated   See above re mobic,   vitamin D     Revisit lab 2 weeks prior    Contact us prn     ANA1:320+ with negative specific Ab repeat LAURENT neg repeat LAURENT neg dsDNA neg repeat LAURENT neg RF neg CCP neg HLAB27 neg Uric acid 5.5 HepBsAbsAgcoreAb neg HCV neg     There is some history to suggest inflammatory arthritis.  Joint pain and swelling in knees  Neurogenic pain in legs and feet     Prev noted/prior plan:  (No overt synovitis but some recent prednisone noted  Diminished breath sounds but otherwise clear   I am not seeing active inflammatory arthritis or connective tissue disease on exam, but she had recent prednisone.   Verbal education and some written   Blood work to further evaluate  Add gabapentin 300mg qhs increasing to bid per response  Revisit 5 weeks   See below)     She tells me when she has joint pain and swelling,   she will have skin changes and bumps in the palm of her hands prior to the onset;   she will plan to have evaluation with Dermatology and see if she can also take a picture when recurs     There is also some mechanical and neurogenic pain.    4/26/2022 xray reports:     cervical spine  Vertebrae and posterior elements: Normal.  Disc spaces: Normal.    Craniocervical junction: Normal.    C1 and C2: Normal.    Soft tissues: Normal.    Additional findings: None seen.    IMPRESSION:    1.  Negative examination.           lumbar spine  Limitations: No technical limitations.    COMPARISON:  No prior relevant studies.    FINDINGS:  Alignment:Normal.    Vertebral bodies and posterior elements:  1. Degenerative changes noted in the posterior facets from L3 to S1.    Disc spaces: Normal.    Soft tissues: Normal.    Additional findings: None seen.    IMPRESSION:    1.  Degenerative changes.           Hands: Mild degenerative changes of the bilateral hands with osteophytes, joint space narrowing, and subchondral sclerosis primarily at the DIP and PIP joints, first metacarpophalangeal joint. Soft tissues are unremarkable. No radiopaque foreign body.    IMPRESSION:    Mild osteoarthritis.             Hips pelvis normal     Knees: No joint effusion. Mild degenerative changes with osteophytes, joint space narrowing, and subchondral sclerosis.  Sclerotic lesion within the distal right femur.    The surrounding soft tissues are unremarkable. IMPRESSION:    1. Mild bilateral knee osteoarthritis.  2. Sclerotic lesion within the distal right femur likely represents an enchondroma. Does the patient have prior imaging available for comparison? Consider MRI with contrast if further evaluation is clinically warranted.         right foot: mild joint space loss at the interphalangeal joints of the toes and at the first digit tarsometatarsal joint. No significant osseous erosions seen. Screws are noted in the medial  malleolus of the right ankle. Soft tissues unremarkable.    IMPRESSION:    Only mild osteoarthritic changes in the right foot. Screws noted in the medial malleolus of the right ankle.             left foot  Limitations: The images appear technically satisfactory.    COMPARISON:  No prior relevant studies are  available at this time.    FINDINGS:  Osseous structures: Normal.    Joints: Normal.    Soft tissues: Normal.    Additional findings: None seen.    IMPRESSION:    1.  Negative examination.              Interim 2/18/22 DEXA: L2 t -1.9; total hip left t -1.77; left fem neck t -1.18         Neuropathy per records     Carpal tunnel surgery     ankle surgery     severe COPD; steroid dependent; chronic hypercapnic respiratory failure; history COVID infection; history recurrent Pneumonias;      Has been managing with:     Meloxicam 15mg daily     Toradol she should not combine with meloxicam which she uses meloxicam     Prednisone 20mg bid 7 days given weeks prior to initial visit     requip      adderall noted     Citalopram      she rather clarifies she no longer uses Lexapro noted     Albuterol     lipitor     She tells me she also has diabetes and had been on insulin in the past noted     Has been working with:     Severe COPD working with Pulmonary     Records PCP Saulojens LOMELI some of most recent note 7/1/21: HPI cough and wheeze; A/P: COPD acute respiratory infection; LAURENT+: Prednisone 20mg bid for 7 days; levofloxacin 500mg daily 7 days     Records Dr. Leblanc 6/26/21: COPD likely severe; chronic hypercapnic respiratory failure; history COVID infection; history recurrent Pneumonias; active tobacco use; obtain CXR and PFTs from PCP or her Pulmonologist; cont inhaler with trelegy for maintenance therapy and albuterol nebulizer treatments; review ABG results from recent hospitalization and assess need for trilogy machine. F/u 1 month     Records Payal CHUNG some of recent note 3/24/21: Hospitalized 12/2020 [facility] for sepsis and bladder infection; completed antibiotics and symptoms resolved. Impression: chronic respiratory failure; severe COPD; steroid dependent; levaquin and prednisone taper; CT chest; cont chatix; revisit 6-8 weeks     Review of medical records as stated above.  Lab +/-  imaging and other ordered diagnostic studies to further evaluate.  See the orders associated with this note visit.  Medications as prescribed as tolerated.    Education including verbal and those noted in the patient instructions.  Revisit as scheduled and call prn.    The following diagnoses influence medical decision making and/or need further workup including the orders listed below:    Pain in other joint  -     LAURENT by IFA, w/Rflx; Future; Expected date: 02/06/2023  -     Comprehensive Metabolic Panel; Future; Expected date: 02/06/2023  -     C-Reactive Protein; Future; Expected date: 02/06/2023  -     C3 Complement; Future; Expected date: 02/06/2023  -     C4 Complement; Future; Expected date: 02/06/2023  -     CBC Auto Differential; Future; Expected date: 02/06/2023  -     Urinalysis Microscopic; Future; Expected date: 02/06/2023  -     Sedimentation rate; Future; Expected date: 02/06/2023  -     CK; Future; Expected date: 02/06/2023  -     meloxicam (MOBIC) 15 MG tablet; Take 1 tablet (15 mg total) by mouth daily as needed for Pain.  Dispense: 30 tablet; Refill: 2  -     traMADoL (ULTRAM) 50 mg tablet; Take 1 tablet (50 mg total) by mouth 2 (two) times daily as needed (moderate to severe pain).  Dispense: 60 tablet; Refill: 2    Other osteoarthritis involving multiple joints  -     LAURENT by SHAUN w/Rflx; Future; Expected date: 02/06/2023  -     Comprehensive Metabolic Panel; Future; Expected date: 02/06/2023  -     C-Reactive Protein; Future; Expected date: 02/06/2023  -     C3 Complement; Future; Expected date: 02/06/2023  -     C4 Complement; Future; Expected date: 02/06/2023  -     CBC Auto Differential; Future; Expected date: 02/06/2023  -     Urinalysis Microscopic; Future; Expected date: 02/06/2023  -     Sedimentation rate; Future; Expected date: 02/06/2023  -     CK; Future; Expected date: 02/06/2023  -     MRI Knee W WO Contrast Left; Future; Expected date: 12/20/2022  -     meloxicam (MOBIC) 15 MG  tablet; Take 1 tablet (15 mg total) by mouth daily as needed for Pain.  Dispense: 30 tablet; Refill: 2  -     traMADoL (ULTRAM) 50 mg tablet; Take 1 tablet (50 mg total) by mouth 2 (two) times daily as needed (moderate to severe pain).  Dispense: 60 tablet; Refill: 2    Neuropathy  -     LAURENT by SHAUN w/Rflx; Future; Expected date: 02/06/2023  -     Comprehensive Metabolic Panel; Future; Expected date: 02/06/2023  -     C-Reactive Protein; Future; Expected date: 02/06/2023  -     C3 Complement; Future; Expected date: 02/06/2023  -     C4 Complement; Future; Expected date: 02/06/2023  -     CBC Auto Differential; Future; Expected date: 02/06/2023  -     Urinalysis Microscopic; Future; Expected date: 02/06/2023  -     Sedimentation rate; Future; Expected date: 02/06/2023  -     CK; Future; Expected date: 02/06/2023  -     gabapentin (NEURONTIN) 300 MG capsule; Take 2 capsules (600 mg total) by mouth 3 (three) times daily.  Dispense: 180 capsule; Refill: 2  -     traMADoL (ULTRAM) 50 mg tablet; Take 1 tablet (50 mg total) by mouth 2 (two) times daily as needed (moderate to severe pain).  Dispense: 60 tablet; Refill: 2    Unilateral primary osteoarthritis, unspecified knee  -     MRI Knee W WO Contrast Left; Future; Expected date: 12/20/2022    Chronic pain of left knee  -     MRI Knee W WO Contrast Left; Future; Expected date: 12/20/2022    Chronic obstructive pulmonary disease, unspecified COPD type  -     LAURENT by SHAUN w/Rflx; Future; Expected date: 02/06/2023  -     Comprehensive Metabolic Panel; Future; Expected date: 02/06/2023  -     C-Reactive Protein; Future; Expected date: 02/06/2023  -     C3 Complement; Future; Expected date: 02/06/2023  -     C4 Complement; Future; Expected date: 02/06/2023  -     CBC Auto Differential; Future; Expected date: 02/06/2023  -     Urinalysis Microscopic; Future; Expected date: 02/06/2023  -     Sedimentation rate; Future; Expected date: 02/06/2023  -     CK; Future; Expected date:  02/06/2023    Osteopenia of multiple sites  -     LAURENT by SHAUN w/Rflx; Future; Expected date: 02/06/2023  -     Comprehensive Metabolic Panel; Future; Expected date: 02/06/2023  -     C-Reactive Protein; Future; Expected date: 02/06/2023  -     C3 Complement; Future; Expected date: 02/06/2023  -     C4 Complement; Future; Expected date: 02/06/2023  -     CBC Auto Differential; Future; Expected date: 02/06/2023  -     Urinalysis Microscopic; Future; Expected date: 02/06/2023  -     Sedimentation rate; Future; Expected date: 02/06/2023  -     CK; Future; Expected date: 02/06/2023  -     cholecalciferol, vitamin D3, (VITAMIN D3) 25 mcg (1,000 unit) capsule; Take 1 capsule (1,000 Units total) by mouth once daily.  Dispense: 30 capsule; Refill: 12    Long term (current) use of systemic steroids  -     LAURENT by SHAUN w/Rflx; Future; Expected date: 02/06/2023  -     Comprehensive Metabolic Panel; Future; Expected date: 02/06/2023  -     C-Reactive Protein; Future; Expected date: 02/06/2023  -     C3 Complement; Future; Expected date: 02/06/2023  -     C4 Complement; Future; Expected date: 02/06/2023  -     CBC Auto Differential; Future; Expected date: 02/06/2023  -     Urinalysis Microscopic; Future; Expected date: 02/06/2023  -     Sedimentation rate; Future; Expected date: 02/06/2023  -     CK; Future; Expected date: 02/06/2023    Screening for rheumatic disorder  -     LAURENT by SHAUN w/Rflx; Future; Expected date: 02/06/2023  -     Comprehensive Metabolic Panel; Future; Expected date: 02/06/2023  -     C-Reactive Protein; Future; Expected date: 02/06/2023  -     C3 Complement; Future; Expected date: 02/06/2023  -     C4 Complement; Future; Expected date: 02/06/2023  -     CBC Auto Differential; Future; Expected date: 02/06/2023  -     Urinalysis Microscopic; Future; Expected date: 02/06/2023  -     Sedimentation rate; Future; Expected date: 02/06/2023  -     CK; Future; Expected date: 02/06/2023    Medication monitoring  encounter  -     LAURENT by IFA, w/Rflx; Future; Expected date: 02/06/2023  -     Comprehensive Metabolic Panel; Future; Expected date: 02/06/2023  -     C-Reactive Protein; Future; Expected date: 02/06/2023  -     C3 Complement; Future; Expected date: 02/06/2023  -     C4 Complement; Future; Expected date: 02/06/2023  -     CBC Auto Differential; Future; Expected date: 02/06/2023  -     Urinalysis Microscopic; Future; Expected date: 02/06/2023  -     Sedimentation rate; Future; Expected date: 02/06/2023  -     CK; Future; Expected date: 02/06/2023    Renal insufficiency  -     LAURENT by IFA, w/Rflx; Future; Expected date: 02/06/2023  -     Comprehensive Metabolic Panel; Future; Expected date: 02/06/2023  -     C-Reactive Protein; Future; Expected date: 02/06/2023  -     C3 Complement; Future; Expected date: 02/06/2023  -     C4 Complement; Future; Expected date: 02/06/2023  -     CBC Auto Differential; Future; Expected date: 02/06/2023  -     Urinalysis Microscopic; Future; Expected date: 02/06/2023  -     Sedimentation rate; Future; Expected date: 02/06/2023  -     CK; Future; Expected date: 02/06/2023    Enchondroma of femur, right  -     LAURENT by IFA, w/Rflx; Future; Expected date: 02/06/2023  -     Comprehensive Metabolic Panel; Future; Expected date: 02/06/2023  -     C-Reactive Protein; Future; Expected date: 02/06/2023  -     C3 Complement; Future; Expected date: 02/06/2023  -     C4 Complement; Future; Expected date: 02/06/2023  -     CBC Auto Differential; Future; Expected date: 02/06/2023  -     Urinalysis Microscopic; Future; Expected date: 02/06/2023  -     Sedimentation rate; Future; Expected date: 02/06/2023  -     CK; Future; Expected date: 02/06/2023      Follow up in about 2 months (around 2/20/2023).     Zachery Brooks MD

## 2022-12-13 LAB
ALBUMIN SERPL BCP-MCNC: 3.8 G/DL (ref 3.4–5)
ALBUMIN/GLOBULIN RATIO: 0.95 RATIO (ref 1.1–1.8)
ALP SERPL-CCNC: 134 U/L (ref 46–116)
ALT SERPL W P-5'-P-CCNC: 31 U/L (ref 12–78)
ANION GAP SERPL CALC-SCNC: 7 MMOL/L (ref 3–11)
APPEARANCE, UA: CLEAR
AST SERPL-CCNC: 33 U/L (ref 15–37)
BASOPHILS NFR BLD: 0.4 % (ref 0–3)
BILIRUB SERPL-MCNC: 0.4 MG/DL (ref 0–1)
BILIRUB UR QL STRIP: NEGATIVE MG/DL
BUN SERPL-MCNC: 15 MG/DL (ref 7–18)
BUN/CREAT SERPL: 13.88 RATIO (ref 7–18)
CALCIUM SERPL-MCNC: 9 MG/DL (ref 8.8–10.5)
CHLORIDE SERPL-SCNC: 100 MMOL/L (ref 100–108)
CO2 SERPL-SCNC: 30 MMOL/L (ref 21–32)
COLOR UR: NORMAL
CREAT SERPL-MCNC: 1.08 MG/DL (ref 0.55–1.02)
CREATINE KINASE, SERUM: 71 U/L (ref 26–308)
CRP QUANTITATIVE: 0.3 MG/DL (ref 0–0.9)
EOSINOPHIL NFR BLD: 0.9 % (ref 1–3)
ERYTHROCYTE [DISTWIDTH] IN BLOOD BY AUTOMATED COUNT: 12.7 % (ref 12.5–18)
ERYTHROCYTE [SEDIMENTATION RATE] IN BLOOD: 22 MM/HR (ref 0–20)
GFR ESTIMATION: 53
GLOBULIN: 4 G/DL (ref 2.3–3.5)
GLUCOSE (UA): NORMAL MG/DL
GLUCOSE SERPL-MCNC: 84 MG/DL (ref 70–110)
HCT VFR BLD AUTO: 38.8 % (ref 37–47)
HGB BLD-MCNC: 12.6 G/DL (ref 12–16)
HGB UR QL STRIP: NEGATIVE /UL
KETONES UR QL STRIP: NEGATIVE MG/DL
LEUKOCYTE ESTERASE UR QL STRIP: NEGATIVE /UL
LYMPHOCYTES NFR BLD: 36 % (ref 25–40)
MCH RBC QN AUTO: 30 PG (ref 27–31.2)
MCHC RBC AUTO-ENTMCNC: 32.5 G/DL (ref 31.8–35.4)
MCV RBC AUTO: 92.4 FL (ref 80–97)
MONOCYTES NFR BLD: 6.8 % (ref 1–15)
NEUTROPHILS # BLD AUTO: 3.86 10*3/UL (ref 1.8–7.7)
NEUTROPHILS NFR BLD: 55.8 % (ref 37–80)
NITRITE UR QL STRIP: NEGATIVE
NUCLEATED RED BLOOD CELLS: 0 %
PH UR STRIP: 6 PH (ref 5–9)
PLATELETS: 262 10*3/UL (ref 142–424)
POTASSIUM SERPL-SCNC: 3.9 MMOL/L (ref 3.6–5.2)
PROT SERPL-MCNC: 7.8 G/DL (ref 6.4–8.2)
PROT UR QL STRIP: NEGATIVE MG/DL
RBC # BLD AUTO: 4.2 10*6/UL (ref 4.2–5.4)
SODIUM BLD-SCNC: 137 MMOL/L (ref 135–145)
SP GR UR STRIP: 1.01 (ref 1–1.03)
SPECIMEN COLLECTION METHOD, URINE: NORMAL
UROBILINOGEN UR STRIP-ACNC: NORMAL MG/DL
WBC # BLD: 6.9 10*3/UL (ref 4.6–10.2)

## 2022-12-15 LAB
ANA SER-ACNC: NORMAL
C3 SERPL-MCNC: 171 MG/DL (ref 90–180)
C4 NEF SERPL-MCNC: 47 MG/DL (ref 10–40)

## 2022-12-20 ENCOUNTER — OFFICE VISIT (OUTPATIENT)
Dept: RHEUMATOLOGY | Facility: CLINIC | Age: 57
End: 2022-12-20
Payer: MEDICAID

## 2022-12-20 VITALS
BODY MASS INDEX: 27.13 KG/M2 | HEART RATE: 60 BPM | RESPIRATION RATE: 18 BRPM | DIASTOLIC BLOOD PRESSURE: 72 MMHG | SYSTOLIC BLOOD PRESSURE: 106 MMHG | OXYGEN SATURATION: 98 % | HEIGHT: 64 IN | WEIGHT: 158.88 LBS

## 2022-12-20 DIAGNOSIS — D16.21 ENCHONDROMA OF FEMUR, RIGHT: ICD-10-CM

## 2022-12-20 DIAGNOSIS — N28.9 RENAL INSUFFICIENCY: ICD-10-CM

## 2022-12-20 DIAGNOSIS — M15.8 OTHER OSTEOARTHRITIS INVOLVING MULTIPLE JOINTS: ICD-10-CM

## 2022-12-20 DIAGNOSIS — Z13.89 SCREENING FOR RHEUMATIC DISORDER: ICD-10-CM

## 2022-12-20 DIAGNOSIS — Z79.52 LONG TERM (CURRENT) USE OF SYSTEMIC STEROIDS: ICD-10-CM

## 2022-12-20 DIAGNOSIS — G62.9 NEUROPATHY: ICD-10-CM

## 2022-12-20 DIAGNOSIS — G89.29 CHRONIC PAIN OF LEFT KNEE: ICD-10-CM

## 2022-12-20 DIAGNOSIS — J44.9 CHRONIC OBSTRUCTIVE PULMONARY DISEASE, UNSPECIFIED COPD TYPE: ICD-10-CM

## 2022-12-20 DIAGNOSIS — M85.89 OSTEOPENIA OF MULTIPLE SITES: ICD-10-CM

## 2022-12-20 DIAGNOSIS — M17.10 UNILATERAL PRIMARY OSTEOARTHRITIS, UNSPECIFIED KNEE: ICD-10-CM

## 2022-12-20 DIAGNOSIS — M25.59 PAIN IN OTHER JOINT: Primary | ICD-10-CM

## 2022-12-20 DIAGNOSIS — Z51.81 MEDICATION MONITORING ENCOUNTER: ICD-10-CM

## 2022-12-20 DIAGNOSIS — M25.562 CHRONIC PAIN OF LEFT KNEE: ICD-10-CM

## 2022-12-20 PROCEDURE — 3074F PR MOST RECENT SYSTOLIC BLOOD PRESSURE < 130 MM HG: ICD-10-PCS | Mod: CPTII,S$GLB,, | Performed by: INTERNAL MEDICINE

## 2022-12-20 PROCEDURE — 99214 PR OFFICE/OUTPT VISIT, EST, LEVL IV, 30-39 MIN: ICD-10-PCS | Mod: S$GLB,,, | Performed by: INTERNAL MEDICINE

## 2022-12-20 PROCEDURE — 3008F PR BODY MASS INDEX (BMI) DOCUMENTED: ICD-10-PCS | Mod: CPTII,S$GLB,, | Performed by: INTERNAL MEDICINE

## 2022-12-20 PROCEDURE — 3008F BODY MASS INDEX DOCD: CPT | Mod: CPTII,S$GLB,, | Performed by: INTERNAL MEDICINE

## 2022-12-20 PROCEDURE — 1159F MED LIST DOCD IN RCRD: CPT | Mod: CPTII,S$GLB,, | Performed by: INTERNAL MEDICINE

## 2022-12-20 PROCEDURE — 99214 OFFICE O/P EST MOD 30 MIN: CPT | Mod: S$GLB,,, | Performed by: INTERNAL MEDICINE

## 2022-12-20 PROCEDURE — 3074F SYST BP LT 130 MM HG: CPT | Mod: CPTII,S$GLB,, | Performed by: INTERNAL MEDICINE

## 2022-12-20 PROCEDURE — 3078F PR MOST RECENT DIASTOLIC BLOOD PRESSURE < 80 MM HG: ICD-10-PCS | Mod: CPTII,S$GLB,, | Performed by: INTERNAL MEDICINE

## 2022-12-20 PROCEDURE — 1159F PR MEDICATION LIST DOCUMENTED IN MEDICAL RECORD: ICD-10-PCS | Mod: CPTII,S$GLB,, | Performed by: INTERNAL MEDICINE

## 2022-12-20 PROCEDURE — 3078F DIAST BP <80 MM HG: CPT | Mod: CPTII,S$GLB,, | Performed by: INTERNAL MEDICINE

## 2022-12-20 PROCEDURE — 1160F RVW MEDS BY RX/DR IN RCRD: CPT | Mod: CPTII,S$GLB,, | Performed by: INTERNAL MEDICINE

## 2022-12-20 PROCEDURE — 1160F PR REVIEW ALL MEDS BY PRESCRIBER/CLIN PHARMACIST DOCUMENTED: ICD-10-PCS | Mod: CPTII,S$GLB,, | Performed by: INTERNAL MEDICINE

## 2022-12-20 RX ORDER — TRAMADOL HYDROCHLORIDE 50 MG/1
50 TABLET ORAL 2 TIMES DAILY PRN
Qty: 60 TABLET | Refills: 2 | Status: SHIPPED | OUTPATIENT
Start: 2022-12-20 | End: 2023-02-24 | Stop reason: SDUPTHER

## 2022-12-20 RX ORDER — GABAPENTIN 300 MG/1
600 CAPSULE ORAL 3 TIMES DAILY
Qty: 180 CAPSULE | Refills: 2 | Status: SHIPPED | OUTPATIENT
Start: 2022-12-20 | End: 2023-02-24 | Stop reason: SDUPTHER

## 2022-12-20 RX ORDER — DULAGLUTIDE 0.75 MG/.5ML
INJECTION, SOLUTION SUBCUTANEOUS
COMMUNITY
Start: 2022-12-05

## 2022-12-20 RX ORDER — VIT C/E/ZN/COPPR/LUTEIN/ZEAXAN 250MG-90MG
1000 CAPSULE ORAL DAILY
Qty: 30 CAPSULE | Refills: 12 | Status: SHIPPED | OUTPATIENT
Start: 2022-12-20 | End: 2023-02-24 | Stop reason: SDUPTHER

## 2022-12-20 RX ORDER — MELOXICAM 15 MG/1
15 TABLET ORAL DAILY PRN
Qty: 30 TABLET | Refills: 2 | Status: SHIPPED | OUTPATIENT
Start: 2022-12-20 | End: 2023-02-24 | Stop reason: ALTCHOICE

## 2023-02-10 NOTE — PROGRESS NOTES
Subjective:      Patient ID: Romi Estrella is a 57 y.o. female.    Chief Complaint: Disease Management    HPI:   Includes joint pain with subjective swelling.   Antecedent event includes: None;  Pain location includes: hands, knees, ankles, and feet;  Gradual onset; beginning >years ago;  Constant ache, Moderate in severity,   Lasting >minutes, Worse at all times;   Improved with rest, medication, change in position, and none;   Worsened with activity, overuse, stress, and rest;         Review of Systems   Constitutional:  Positive for fatigue. Negative for fever and unexpected weight change.   HENT:  Positive for mouth dryness. Negative for mouth sores and trouble swallowing.    Eyes:  Negative for redness.   Respiratory:  Positive for shortness of breath. Negative for cough.    Cardiovascular:  Positive for palpitations and leg swelling. Negative for chest pain.   Gastrointestinal:  Positive for constipation. Negative for diarrhea.   Genitourinary:  Positive for nocturia. Negative for dysuria and genital sores.   Musculoskeletal:  Positive for arthralgias, joint swelling and myalgias.   Integumentary:  Negative for rash.   Neurological:  Positive for dizziness, weakness and headaches.   Hematological:  Bruises/bleeds easily.   Psychiatric/Behavioral:  Positive for sleep disturbance. Negative for dysphoric mood. The patient is not nervous/anxious.     Past Medical History:   Diagnosis Date    Acid reflux     Anxiety     Asthma     Depression     Hyperlipidemia     Hypertension     Neuropathy     Tachycardia      Past Surgical History:   Procedure Laterality Date    ANKLE SURGERY      APPENDECTOMY      CARPAL TUNNEL RELEASE      CATARACT EXTRACTION      CHOLECYSTECTOMY      DIVERTICULECTOMY      HYSTERECTOMY        See the Assessment/Plan for further characterization of the HPI, ROS, Medical, Surgical, Family, and Social Histories including Tobacco use, Meds; all of which has been reviewed in Epic.    Medication  List with Changes/Refills   Current Medications    ALBUTEROL (PROVENTIL) 2.5 MG /3 ML (0.083 %) NEBULIZER SOLUTION    use one vial in nebulizer four times a day as needed    ALBUTEROL (PROVENTIL/VENTOLIN HFA) 90 MCG/ACTUATION INHALER    Inhale 1 puff into the lungs.    ATORVASTATIN (LIPITOR) 40 MG TABLET    Take 40 mg by mouth once daily.    BUTALBITAL-ACETAMINOPHEN-CAFF -40 MG CAP    TAKE ONE CAPSULE BY MOUTH EVERY 6 HOURS AS NEEDED FOR HEADACHE    CARVEDILOL (COREG) 3.125 MG TABLET        CHANTIX CONTINUING MONTH BOX 1 MG TAB    Take 1 mg by mouth 2 (two) times daily.    CITALOPRAM (CELEXA) 40 MG TABLET    Take 40 mg by mouth every morning.    CLONAZEPAM (KLONOPIN) 1 MG TABLET    Take 1 mg by mouth.    DEXTROAMPHETAMINE-AMPHETAMINE 30 MG TAB    Take 30 mg by mouth.    ESCITALOPRAM OXALATE (LEXAPRO) 20 MG TABLET    Take 20 mg by mouth.    ESTRADIOL (ESTRACE) 1 MG TABLET    Take 1 mg by mouth.    FAMOTIDINE (PEPCID) 20 MG TABLET    Take 20 mg by mouth 2 (two) times daily.    FLUTICASONE PROPIONATE (FLONASE) 50 MCG/ACTUATION NASAL SPRAY    50 mcg by Nasal route.    LISINOPRIL 10 MG TABLET    Take 10 mg by mouth once daily.    NICOTINE (NICODERM CQ) 14 MG/24 HR    Place 1 patch onto the skin.    ROPINIROLE (REQUIP) 0.5 MG TABLET        TRELEGY ELLIPTA 200-62.5-25 MCG INHALER    inhale one puff by mouth once a day. rinse mouth after each dose    TRULICITY 0.75 MG/0.5 ML PEN INJECTOR    inject 0.5 ML UNDER SKIN ONCE a WEEK   Changed and/or Refilled Medications    Modified Medication Previous Medication    CHOLECALCIFEROL, VITAMIN D3, (VITAMIN D3) 25 MCG (1,000 UNIT) CAPSULE cholecalciferol, vitamin D3, (VITAMIN D3) 25 mcg (1,000 unit) capsule       Take 1 capsule (1,000 Units total) by mouth once daily.    Take 1 capsule (1,000 Units total) by mouth once daily.    GABAPENTIN (NEURONTIN) 300 MG CAPSULE gabapentin (NEURONTIN) 300 MG capsule       Take 2 capsules (600 mg total) by mouth 3 (three) times daily.    Take  "2 capsules (600 mg total) by mouth 3 (three) times daily.    TRAMADOL (ULTRAM) 50 MG TABLET traMADoL (ULTRAM) 50 mg tablet       Take 1 tablet (50 mg total) by mouth 3 (three) times daily as needed (moderate to severe pain).    Take 1 tablet (50 mg total) by mouth 2 (two) times daily as needed (moderate to severe pain).   Discontinued Medications    MELOXICAM (MOBIC) 15 MG TABLET    Take 1 tablet (15 mg total) by mouth daily as needed for Pain.         Objective:   /77   Pulse 69   Resp 18   Ht 5' 4" (1.626 m)   Wt 71 kg (156 lb 8 oz)   SpO2 98%   BMI 26.86 kg/m²   Physical Exam  Non-toxic appearance. No distress.   Normocephalic and atraumatic.   External ears normal.    Conjunctivae and EOM are normal. No scleral icterus.   RRR, No friction rub; palpable distal pulses.    No tachypnea or signs of respiratory distress.   Abdominal: No guarding or rebound tenderness.   Neurological: Oriented. Normal thought content.   Skin is warm. No pallor.   Musculoskeletal: DJD. see below for further input.     [facility] UNKNOWN RAD EAP                                RADIOLOGY REPORT        PT NAME: MUNA RAWLS      [facility]     : 1965 F 56             [address]    ACCT: [id number]                                              [facility] Rec #: [id number]                                        [address]    Patient Location: [place].[facility]/             Procedure: FOOT LIMITED 2 VIEWS    REQUISITION #: [id number]      REPORT #: [id number]           DATE OF EXAM: 22    TIME OF EXAM: 1236       Technique: Frontal, oblique, and lateral views of the right foot.        Clinical history: Arthritis.        COMPARISON: None.        FINDINGS:        No fracture or dislocation. Family mild joint space loss at the   interphalangeal joints of the toes and at the first digit tarsometatarsal   joint. No significant osseous erosions seen. Screws are noted in the medial    malleolus of the right " ankle. Soft tissues unremarkable.        IMPRESSION:        Only mild osteoarthritic changes in the right foot. Screws noted in the   medial malleolus of the right ankle.                DICTATING PHYSICIAN:  LAST HOFFMAN MD                   Date Dictated: 22        Signed By:  LAST HOFFMAN MD <Electronically signed by LAST HOFFMAN MD in    OV>    Date Signed:  22     CC: RONEN CASTRO MD ; RONEN CASTRO MD      ADMITTING PHYSICIAN:                                                                                                    ORDERING PHY: RONEN CASTRO MD                                                                                                                                                      ATTENDING PHY: RONEN CASTRO MD    Patient Status:  REG CLI    Admit Service Date: 22       X-Ray Lumbar Spine 2 Or 3 Views                                RADIOLOGY REPORT        PT NAME: MUNA RAWLS      [facility]     : 1965 F 56             [address]    ACCT: [id number]                                              [facility] Rec #: [id number]                                        [address]    Patient Location: [place]/             Procedure: SPINE LUMBSAC LM 2V OR 3V    REQUISITION #: [id number]      REPORT #: [id number]           DATE OF EXAM: 22    TIME OF EXAM: 1237       CLINICAL HISTORY:    Arthritis        TECHNIQUE:    Views: 3 views of the lumbar spine    Limitations: No technical limitations.        COMPARISON:    No prior relevant studies.        FINDINGS:    Alignment:Normal.        Vertebral bodies and posterior elements:    1. Degenerative changes noted in the posterior facets from L3 to S1.        Disc spaces: Normal.        Soft tissues: Normal.        Additional findings: None seen.        IMPRESSION:        1.  Degenerative changes.            This document was created using a voice recognition transcribing  system.   Incorrect words or phrases may have been missed during proof reading. Please   interpret accordingly or contact the radiologist for clarification if   necessary.        DICTATING PHYSICIAN:  GERMÁN CERRATO MD                   Date Dictated: 22        Signed By:  GERMÁN CERRATO MD <Electronically signed by GERMÁN CERRATO MD in OV>    Date Signed:  22     CC: RONEN CASTRO MD ; RONEN CASTRO MD      ADMITTING PHYSICIAN:                                                                                                    ORDERING PHY: RONEN CASTRO MD                                                                                                                                                      ATTENDING PHY: RONEN CASTRO MD    Patient Status:  REG CLI    Admit Service Date: 22       X-Ray Cervical Spine 2 or 3 Views                                RADIOLOGY REPORT        PT NAME: MUNA RAWLS      [facility]     : 1965 F 56             [address]    ACCT: [id number]                                              [facility] Rec #: [id number]                                        [id number]    Patient Location: [place]/             Procedure: SPINE CERV 2V or 3V    REQUISITION #: [id number]      REPORT #: [id number]           DATE OF EXAM: 22    TIME OF EXAM: 1235       CLINICAL DATA:    Arthritis        TECHNIQUE:    Views: 3 views of the cervical spine were obtained.    Limitations: The images are technically satisfactory.        COMPARISON:    No prior relevant studies are available.        FINDINGS:    Alignment: Normal.        Vertebrae and posterior elements: Normal.        Disc spaces: Normal.        Craniocervical junction: Normal.        C1 and C2: Normal.        Soft tissues: Normal.        Additional findings: None seen.        IMPRESSION:        1.  Negative examination.            This document was created  using a voice recognition transcribing system.   Incorrect words or phrases may have been missed during proof reading. Please   interpret accordingly or contact the radiologist for clarification if   necessary.        DICTATING PHYSICIAN:  GERMÁN CERRATO MD                   Date Dictated: 22        Signed By:  GERMÁN CERRATO MD <Electronically signed by GERMÁN CERRATO MD in OV>    Date Signed:  22     CC: RONEN CASTRO MD ; RONEN CASTRO MD      ADMITTING PHYSICIAN:                                                                                                    ORDERING PHY: RONEN CASTRO MD                                                                                                                                                      ATTENDING PHY: RONEN CASTRO MD    Patient Status:  REG CLI    Admit Service Date: 22       X-Ray Hips Bilateral 2 View Inc AP Pelvis                                RADIOLOGY REPORT        PT NAME: MUNA RAWLS      [facility]     : 1965 F 56             [address]    ACCT: [id number]                                              [facility] Rec #: [id number]                                        [address]    Patient Location: [place]/             Procedure: HIPS CASE MIN 2 V W PELVIS    REQUISITION #: [id number]      REPORT #: [id number]           DATE OF EXAM: 22    TIME OF EXAM: 1236       Clinical data:    Arthritis        Technique:    Views: A single view the pelvis and 2 views of each hip    Limitations: The images are technically satisfactory.        Comparison:    No prior relevant studies are available.        Findings:    No osseous, articular or soft tissue abnormality seen.        Impression:        1.  Negative exam.            This document was created using a voice recognition transcribing system.   Incorrect words or phrases may have been missed during proof reading. Please    interpret accordingly or contact the radiologist for clarification if   necessary.        DICTATING PHYSICIAN:  GERMÁN CERRATO MD                   Date Dictated: 22        Signed By:  GERMÁN CERRATO MD <Electronically signed by GERMÁN CERRATO MD in OV>    Date Signed:  22     CC: RONEN CASTRO MD ; RONEN CASTRO MD      ADMITTING PHYSICIAN:                                                                                                    ORDERING PHY: RONEN CASTRO MD                                                                                                                                                      ATTENDING PHY: RONEN CASTRO MD    Patient Status:  REG CLI    Admit Service Date: 22       [facility] UNKNOWN RAD EAP                                RADIOLOGY REPORT        PT NAME: MUNA RAWLS      [facility]     : 1965 F 56             [address]    ACCT: [id number]                                              [facility] Rec #: [id number]                                        [id number]    Patient Location: [place]/             Procedure: FOOT LIMITED 2 VIEWS    REQUISITION #: [id number]      REPORT #: [id number]           DATE OF EXAM: 22    TIME OF EXAM: 1236       PROCEDURE: FOOT LIMITED 2 VIEWS Left        CLINICAL DATA:    Arthritis        TECHNIQUE:    Views: 3 views of the left foot    Limitations: The images appear technically satisfactory.        COMPARISON:    No prior relevant studies are available at this time.        FINDINGS:    Osseous structures: Normal.        Joints: Normal.        Soft tissues: Normal.        Additional findings: None seen.        IMPRESSION:        1.  Negative examination.            This document was created using a voice recognition transcribing system.   Incorrect words or phrases may have been missed during proof reading. Please   interpret accordingly or contact the  radiologist for clarification if   necessary.        DICTATING PHYSICIAN:  GERMÁN CERRATO MD                   Date Dictated: 22        Signed By:  GERMÁN CERRATO MD <Electronically signed by GERMÁN CERRATO MD in OV>    Date Signed:  22     CC: RONEN CASTRO MD ; RONEN CASTRO MD      ADMITTING PHYSICIAN:                                                                                                    ORDERING PHY: RONEN CASTRO MD                                                                                                                                                      ATTENDING PHY: RONEN CASTRO MD    Patient Status:  REG CLI    Admit Service Date: 22       [facility] UNKNOWN RAD EAP                                RADIOLOGY REPORT        PT NAME: MUNA RAWLS      [facility]     : 1965 F 56             [address]    ACCT: [id number]                                              [facility] Rec #: [id number]                                        [address]    Patient Location: [place]/             Procedure: KNEE LIMITED 1 or 2 VIEWS    REQUISITION #: [id number]      REPORT #: [id number]           DATE OF EXAM: 22    TIME OF EXAM: 1236       Frontal and lateral views of the bilateral knees.        HISTORY:  Arthritis.        COMPARISON: No prior similar studies are available for comparison.        FINDINGS:        No fracture or dislocation.  No joint effusion.        Mild degenerative changes with osteophytes, joint space narrowing, and   subchondral sclerosis.    Sclerotic lesion within the distal right femur.        The surrounding soft tissues are unremarkable. No radiopaque foreign body.         IMPRESSION:        1. Mild bilateral knee osteoarthritis.    2. Sclerotic lesion within the distal right femur likely represents an   enchondroma. Does the patient have prior imaging available for comparison?   Consider MRI with  contrast if further evaluation is clinically warranted.        DICTATING PHYSICIAN:  ANA MARÍA OLIVO MD                   Date Dictated: 22 141        Signed By:  ANA MARÍA OLIVO MD <Electronically signed by ANA MARÍA OLIVO MD in    OV>    Date Signed:  22 1413     CC: RONEN CASTRO MD ; RONEN CASTRO MD      ADMITTING PHYSICIAN:                                                                                                    ORDERING PHY: RONEN CASTRO MD                                                                                                                                                      ATTENDING PHY: RONEN CASTRO MD    Patient Status:  REG CLI    Admit Service Date: 22       [facility] UNKNOWN RAD EAP                                RADIOLOGY REPORT        PT NAME: MUNA RAWLS      [facility]     : 1965 F 56             [address]    ACCT: [id number]                                              [facility] Rec #: [id number]                                        [address]    Patient Location: [place]/             Procedure: KNEE LIMITED 1 or 2 VIEWS    REQUISITION #: [id number]      REPORT #: [id number]           DATE OF EXAM: 22    TIME OF EXAM: 1236       Frontal and lateral views of the bilateral knees.        HISTORY:  Arthritis.        COMPARISON: No prior similar studies are available for comparison.        FINDINGS:        No fracture or dislocation.  No joint effusion.        Mild degenerative changes with osteophytes, joint space narrowing, and   subchondral sclerosis.    Sclerotic lesion within the distal right femur.        The surrounding soft tissues are unremarkable. No radiopaque foreign body.         IMPRESSION:        1. Mild bilateral knee osteoarthritis.    2. Sclerotic lesion within the distal right femur likely represents an   enchondroma. Does the patient have prior imaging available for comparison?   Consider MRI  with contrast if further evaluation is clinically warranted.        DICTATING PHYSICIAN:  ANA MARÍA OLIVO MD                   Date Dictated: 22 1411        Signed By:  ANA MARÍA OLIVO MD <Electronically signed by ANA MARÍA LOIVO MD in    OV>    Date Signed:  22 1413     CC: RONEN CASTRO MD ; RONEN CASTRO MD      ADMITTING PHYSICIAN:                                                                                                    ORDERING PHY: RONEN CASTRO MD                                                                                                                                                      ATTENDING PHY: RONEN CASTRO MD    Patient Status:  REG CLI    Admit Service Date: 22       [facility] UNKNOWN RAD EAP                                RADIOLOGY REPORT        PT NAME: MUNA RAWLS      [facility]     : 1965 F 56             [address]    ACCT: [id number]                                              [facility] Rec #: [id number]                                        [address]    Patient Location: [place]/             Procedure: HAND COMPLETE MIN 3V    REQUISITION #: [id number]      REPORT #: [id number]           DATE OF EXAM: 22    TIME OF EXAM: 1235       Technique: Frontal, oblique, and lateral views of the bilateral hands.        Clinical history: Arthritis.        COMPARISON: None.        FINDINGS:        No fracture or dislocation. Mild degenerative changes of the bilateral hands   with osteophytes, joint space narrowing, and subchondral sclerosis primarily   at the DIP and PIP joints, first metacarpophalangeal joint. Soft tissues are   unremarkable. No radiopaque foreign body.        IMPRESSION:        Mild osteoarthritis.                DICTATING PHYSICIAN:  ANA MARÍA OLIVO MD                   Date Dictated: 22 1408        Signed By:  ANA MARÍA OLIVO MD <Electronically signed by ANA MARÍA OLIVO MD in    OV>    Date Signed:   22 1409     CC: RONEN CASTRO MD ; RONEN CASTRO MD      ADMITTING PHYSICIAN:                                                                                                    ORDERING PHY: RONEN CASTRO MD                                                                                                                                                      ATTENDING PHY: RONEN CASTRO MD    Patient Status:  REG CLI    Admit Service Date: 22       [facility] UNKNOWN RAD EAP                                RADIOLOGY REPORT        PT NAME: MUNA RAWLS [facility]     : 1965 F 56             [address]    ACCT: [id number]                                              [facility] Rec #: [id number]                                        [id number]    Patient Location: [place].RAD/             Procedure: HAND COMPLETE MIN 3V    REQUISITION #: [id number]      REPORT #: [id number]           DATE OF EXAM: 22    TIME OF EXAM: 1235       Technique: Frontal, oblique, and lateral views of the bilateral hands.        Clinical history: Arthritis.        COMPARISON: None.        FINDINGS:        No fracture or dislocation. Mild degenerative changes of the bilateral hands   with osteophytes, joint space narrowing, and subchondral sclerosis primarily   at the DIP and PIP joints, first metacarpophalangeal joint. Soft tissues are   unremarkable. No radiopaque foreign body.        IMPRESSION:        Mild osteoarthritis.                DICTATING PHYSICIAN:  ANA MARÍA OLIVO MD                   Date Dictated: 22 1408        Signed By:  ANA MARÍA OLIVO MD <Electronically signed by ANA MARÍA OLIVO MD in    OV>    Date Signed:  22     CC: RONEN CASTRO MD ; RONEN CASTRO MD      ADMITTING PHYSICIAN:                                                                                                    ORDERING PHY: RONEN CASTRO MD                                                                                                                                                       ATTENDING PHY: RONEN CASTRO MD    Patient Status:  REG CLI    Admit Service Date: 04/26/22         Lab Requisition on 02/02/2023   Component Date Value Ref Range Status    Urine Culture 02/02/2023 No Significant Growth   Final   Orders Only on 12/13/2022   Component Date Value Ref Range Status    C3 Complement 12/13/2022 171  90 - 180 mg/dL Final    Comment: REFERENCE INTERVAL: Complement Component 3Access complete set of age- and/or gender-specific referenceintervals for this test in the [facility] Test Directory([email or web address]).Performed By: [facility] [place] [address]   [id number] Director: Bertram Bonner MD, PhD      C4 Complement 12/13/2022 47 (A)  10 - 40 mg/dL Final    Comment: REFERENCE INTERVAL: Complement Component 4Access complete set of age- and/or gender-specific referenceintervals for this test in the [facility] Test Directory([email or web address]).Performed By: [facility] [place] [address]   [id number] Director: Bertram Bonner MD, PhD      LAURENT 12/13/2022 NONE DETECTED  None Detected Final    Comment: If suspicion of connective tissue disease is strong and LAURENT EIAis negative, consider testing for LAURENT by IFA ([id number]).No antibodies to Anti-Nuclear Antibodies (LAURENT) detected.  TheExtractable Nuclear Antigen Antibodies (RNP, Dean, SSA 52, SSA60,   Scleroderma, Mae-1 and SSB) and Double Stranded DNA (dsDNA)Antibody, IgG will not be performed.INTERPRETIVE INFORMATION: Anti-Nuclear Antibodies (LAURENT), IgG byELISAAntinuclear Antibodies (LAURENT), IgG by KAVITAH: LAURENT specimens arescreened using enzyme-linked   immunosorbent assay (KAVITHA)methodology. All KAVITHA results reported as Detected are furthertested by indirect fluorescent assay (IFA) using HEp-2 substratewith an IgG-specific conjugate. The LAURENT KAVITHA screen is designedto detect antibodies against  dsDNA,   histones, SS-A (Ro), SS-B(La), Dean, Smith/RNP, Scl-70, Mae-1, centromeric proteins,other antigens extracted from the HEp-2 cell nucleus. LAURENT ELISAassays have been reported to have lower sensitivities than ANAIFA for systemic autoimmune rheum                           atic   diseases (SARD).Negative results do not necessarily rule out SARD.Performed By: [facility] [place] [address] [id number] Director: Bertram Bonner MD, PhD     Orders Only on 12/13/2022   Component Date Value Ref Range Status    WBC 12/13/2022 6.9  4.6 - 10.2 10*3/uL Final    RBC 12/13/2022 4.20  4.2 - 5.4 10*6/uL Final    Hemoglobin 12/13/2022 12.6  12.0 - 16.0 g/dL Final    Hematocrit 12/13/2022 38.8  37.0 - 47.0 % Final    MCV 12/13/2022 92.4  80 - 97 fL Final    MCH 12/13/2022 30.0  27.0 - 31.2 pg Final    MCHC 12/13/2022 32.5  31.8 - 35.4 g/dL Final    RDW RBC Auto-Rto 12/13/2022 12.7  12.5 - 18.0 % Final    Platelets 12/13/2022 262  142 - 424 10*3/uL Final    Neutrophils 12/13/2022 55.8  37 - 80 % Final    Lymphocytes 12/13/2022 36.0  25 - 40 % Final    Monocytes 12/13/2022 6.8  1 - 15 % Final    Eosinophils 12/13/2022 0.9 (L)  1 - 3 % Final    Basophils 12/13/2022 0.4  0 - 3 % Final    nRBC# 12/13/2022 0.0  % Final    Neutrophils Absolute 12/13/2022 3.86  1.8 - 7.7 10*3/uL Final    Sed Rate 12/13/2022 22 (H)  0 - 20 mm/hr Final   Orders Only on 12/13/2022   Component Date Value Ref Range Status    Specimen Collection Method 12/13/2022 Void   Final    Color, UA 12/13/2022 Pale Yellow  Yellow Final    Appearance, UA 12/13/2022 Clear  Clear Final    pH, UA 12/13/2022 6.0  5.0 - 9.0 pH Final    Specific Gravity, UA 12/13/2022 1.010  1.000 - 1.030 Final    Protein, UA 12/13/2022 Negative  Negative mg/dL Final    Glucose, UA 12/13/2022 Normal  Normal mg/dL Final    Ketones, UA 12/13/2022 Negative  Negative mg/dL Final    Occult Blood UA 12/13/2022 Negative  Negative /uL Final    Nitrite, UA 12/13/2022  Negative  Negative Final    Bilirubin (UA) 12/13/2022 Negative  Negative mg/dL Final    Urobilinogen, UA 12/13/2022 Normal  Normal mg/dL Final    Leukocytes, UA 12/13/2022 Negative  Negative /uL Final   Orders Only on 12/13/2022   Component Date Value Ref Range Status    Sodium 12/13/2022 137  135 - 145 mmol/L Final    Potassium 12/13/2022 3.9  3.6 - 5.2 mmol/L Final    Chloride 12/13/2022 100  100 - 108 mmol/L Final    CO2 12/13/2022 30  21 - 32 mmol/L Final    Anion Gap 12/13/2022 7.0  3.0 - 11.0 mmol/L Final    BUN 12/13/2022 15  7 - 18 mg/dL Final    Creatinine 12/13/2022 1.08 (H)  0.55 - 1.02 mg/dL Final    GFR ESTIMATION 12/13/2022 53 (L)  >60 Final               DESCRIPTION       GLOMERULAR FILTRATION RATE             NORMAL                     >60             KIDNEY  DISEASE           15-60             KIDNEY FAILURE             <15    BUN/Creatinine Ratio 12/13/2022 13.88  7 - 18 Ratio Final    Glucose 12/13/2022 84  70 - 110 mg/dL Final    Calcium 12/13/2022 9.0  8.8 - 10.5 mg/dL Final    Total Bilirubin 12/13/2022 0.4  0.0 - 1.0 mg/dL Final    AST 12/13/2022 33  15 - 37 U/L Final    ALT 12/13/2022 31  12 - 78 U/L Final    Total Protein 12/13/2022 7.8  6.4 - 8.2 g/dL Final    Albumin 12/13/2022 3.8  3.4 - 5.0 g/dL Final    Globulin 12/13/2022 4.0 (H)  2.3 - 3.5 g/dL Final    Albumin/Globulin Ratio 12/13/2022 0.950 (L)  1.1 - 1.8 Ratio Final    Alkaline Phosphatase 12/13/2022 134 (H)  46 - 116 U/L Final    CK, Serum 12/13/2022 71  26 - 308 U/L Final    CRP QUANTITATIVE 12/13/2022 0.3  0.0 - 0.9 mg/dL Final   Lab Requisition on 10/03/2022   Component Date Value Ref Range Status    Urine Culture 10/03/2022 No Growth   Final   Lab Requisition on 06/20/2022   Component Date Value Ref Range Status    PT 06/20/2022 12.6  12.5 - 14.5 seconds Final    INR 06/20/2022 0.95  0.00 - 1.30 Final        All of the data above and below has been reviewed by myself and any further interpretations will be reflected in the  assessment and plan.   The data includes review of external notes, and independent interpretation of lab results, x-rays, and imaging reports.  Active inflammatory arthritis is an illness that poses a threat to bodily function and even a threat to life in some cases.  Drug therapy to treat inflammatory arthritis usually requires intensive monitoring for toxicity.    Review of patient's allergies indicates:  No Known Allergies  Assessment/Plan:          Prev noted/prior plan:  (No overt synovitis but some recent prednisone noted  Diminished breath sounds but otherwise clear     I am not seeing active inflammatory arthritis or connective tissue disease on exam, but she had recent prednisone.     Verbal education and some written     Blood work to further evaluate     Add gabapentin 300mg qhs increasing to bid per response     Revisit 5 weeks   See below)     Visit prior to Visit prior to Visit prior to Last visit:     Interim lab 9/3/21:     WBC 8.5; Hgb 13.9; plt 236     Creat 0.93; alb 3.8     C3 155  C4 39     SPEP normal     LAURENT, RF, CCP not done     UA not done     HLAB27 neg Uric acid 5.5 HepBsAbsAgcoreAb neg HCV neg     CPK 76     ESR 19 CRP 7     She will consider getting lab elsewhere as some not done and they also never sent the results noted     She has some interim shoulder pain symptoms of inflammatory arthritis in feet and pain in legs     gabapentin 300mg bid      Meloxicam 15mg daily and toradol and      she should not combine choose one or other and clarify with prescribing MD as per ongoing plan     No overt synovitis but she is on prednisone 20mg for bronchitis with PCP noted  Diminished breath sounds prolonged expiration wheezing     Went over lab with her     She should consider also working with Orthopedics and Physical Medicine and Rehab MD if needed and      Verbal education     She will plan to call if problem or question or joint swelling recurs, but it sounds like mostly mechanical and some  neurogenic pain     Increase gabapentin 300mg tid     Otherwise 4 months with lab 2 weeks prior     Smoking cessation counseled 3 minutes noted     Contact us prn     Visit prior to Visit prior to Last visit:     Interim lab 1/13/22:        LAURENT neg CCP neg     C3 135  C4 28     Creat 0.99 GFR 58; alb 3.2 (prior 3.8)     glc 184     WBC 10.5; Hgb 11.9 MCV plt 261     UA SG>1.03     ESR 30 CRP 3        Meloxicam 15mg daily prn and toradol and   she should not combine choose one or other and clarify with prescribing MD as per ongoing plan prev noted     gabapentin 300mg tid     She is currently finishing an antibiotic for COPD noted  Metformin added interim and she has been dieting and lost some weight     She is disabled from her COPD and thought COVID made things worse; on O2 at night; no interim prednisone     She has some interim symptoms of inflammatory arthritis; also neurogenic and mechanical pain; including feet and hands; neck back hips knees     No overt synovitis  Diminished breath sounds but mostly clear     Went over lab      Sat and talked listened     Verbal education     Xrays to further evaluate     DEXA screening      Add vitamin D 1000 units daily if not using     Increase gabapentin 300mg qid and could be increased further      Refill meloxicam 15mg daily prn     Add tramadol 100mg tid prn #7 day supply with care and caveats so that her insurance will cover and plan for her to stretch it out as may not need everyday     Revisit with lab 2 weeks prior     Contact us prn     Visit prior to Last visit:     Interim lab 4/15/22:     C3 127  C4 26     Creat 0.95; alb 3.9     UA SG 1.015 5-9 WBC     WBC 6.9; Hgb 12.7; plt 251     LAURENT neg dsDNA neg        ESR 30 CRP 5     Interim 4/26/2022 xray reports:     cervical spine Vertebrae and posterior elements: Normal.  Disc spaces: Normal.    Craniocervical junction: Normal.    C1 and C2: Normal.    Soft tissues: Normal.    Additional findings: None seen.     IMPRESSION:    1.  Negative examination.           lumbar spine  Limitations: No technical limitations.    COMPARISON:  No prior relevant studies.    FINDINGS:  Alignment:Normal.    Vertebral bodies and posterior elements:  1. Degenerative changes noted in the posterior facets from L3 to S1.    Disc spaces: Normal.    Soft tissues: Normal.    Additional findings: None seen.    IMPRESSION:    1.  Degenerative changes.           Hands: Mild degenerative changes of the bilateral hands with osteophytes, joint space narrowing, and subchondral sclerosis primarily at the DIP and PIP joints, first metacarpophalangeal joint. Soft tissues are unremarkable. No radiopaque foreign body.    IMPRESSION:    Mild osteoarthritis.             Hips pelvis normal     Knees: No joint effusion. Mild degenerative changes with osteophytes, joint space narrowing, and subchondral sclerosis.  Sclerotic lesion within the distal right femur.    The surrounding soft tissues are unremarkable. IMPRESSION:    1. Mild bilateral knee osteoarthritis.  2. Sclerotic lesion within the distal right femur likely represents an enchondroma. Does the patient have prior imaging available for comparison? Consider MRI with contrast if further evaluation is clinically warranted.         right foot: mild joint space loss at the interphalangeal joints of the toes and at the first digit tarsometatarsal joint. No significant osseous erosions seen. Screws are noted in the medial  malleolus of the right ankle. Soft tissues unremarkable.    IMPRESSION:    Only mild osteoarthritic changes in the right foot. Screws noted in the medial malleolus of the right ankle.             left foot  Limitations: The images appear technically satisfactory.    COMPARISON:  No prior relevant studies are available at this time.    FINDINGS:  Osseous structures: Normal.    Joints: Normal.    Soft tissues: Normal.    Additional findings: None seen.    IMPRESSION:    1.  Negative examination.               Interim 2/18/22 DEXA: L2 t -1.9; total hip left t -1.77; left fem neck t -1.18        Prior plan Add vitamin D 1000 units daily if not using     Increase gabapentin 300mg qid and could be increased further      Refill meloxicam 15mg daily prn     Prior plan Add tramadol 100mg tid prn #7 day supply with care and caveats so that her insurance will cover and plan for her to stretch it out as may not need everyday     She did have the tramadol refilled twice interim ntoed        She has been doing fairly well;     She uses gabapentin 300mg 300mg 600mg that is working for her     Tramadol prn she uses daily sometimes bid pr     meloxicam prn     She injured her left ankle foot interim and was on crutches and walker with Urgent care and doing better     No overt synovitis     Went over interim lab DEXA xrays reports     Conservative measures including Vitamin D and adequate calcium diet for the Osteopenia for now     Consider bisphosphonate if using steroids regularly; she has not used recently     Plan for repeat DEXA in 2025     We will refer to Orthopedics for knee pain DJD and the possible enchondroma     Increase tramadol 50mg bid prn     Cont gabapentin, mobic, vitamin D      Revisit 4 months with lab 2 weeks prior        Contact us prn      Last visit:     Interim lab:        Lab Results   Component Value Date      12/13/2022     K 3.9 12/13/2022      12/13/2022     CO2 30 12/13/2022     ANIONGAP 7.0 12/13/2022     GLU 84 12/13/2022     CALCIUM 9.0 12/13/2022     BUN 15 12/13/2022     CREATININE 1.08 (H) 12/13/2022     PROT 7.8 12/13/2022     AST 33 12/13/2022     ALT 31 12/13/2022     BILITOT 0.4 12/13/2022     ALKPHOS 134 (H) 12/13/2022     WBC 6.9 12/13/2022     NEUTROPHILS 55.8 12/13/2022     HGB 12.6 12/13/2022     MCV 92.4 12/13/2022     LABPLAT 262 12/13/2022     SEDRATE 22 (H) 12/13/2022     PROTEINUA Negative 12/13/2022            GFR 53 prior     Alb 3.8     LAURENT neg     C3 171  C4  47     ESR 22; CRP 0.3     Prev noted/prior plan:  (She has been doing fairly well;  She uses gabapentin 300mg 300mg 600mg that is working for her     Tramadol prn she uses daily sometimes bid pr     meloxicam prn     She injured her left ankle foot interim and was on crutches and walker with Urgent care and doing better     No overt synovitis     Went over interim lab DEXA xrays reports     Conservative measures including Vitamin D and adequate calcium diet for the Osteopenia for now     Consider bisphosphonate if using steroids regularly; she has not used recently     Plan for repeat DEXA in 2025     We will refer to Orthopedics for knee pain DJD and the possible enchondroma   Increase tramadol 50mg bid prn   Cont gabapentin, mobic, vitamin D )           We had planned to see her much sooner? She had missed.     She has interim symptoms of inflammatory arthritis worse in left knee     She saw Dr. Hobbs interim as per below     Looks like she have had interim problem with diverticulosis per very limited records in Epic     Yes she had diverticulosis surgery and tells me 'it was real bad' but not much other details but doing better noted     She also had cataract surgery     She has a lot of pain at rest     She has room to increase the gabapentin     No overt synovitis     Went over lab     I am not seeing active inflammatory arthritis or connective tissue disease on exam or lab noted.     Careful with Mobic given interim GFR if using (last given in 8 months ago with 4 refills noted)     Consider MRI most symptomatic joint to further evaluate for inflammatory arthritis not over on exam or lab  She tells me her left knee is most symptomatic     Tells me she saw Dr. Hobbs who is holding off anything further for the right knee as told her the tumor has been there for some time and observing for now     MRI left knee to further evaluate for inflammatory arthritis not overt on exam   It may also show some mechanical  problem that she can touch base with Dr. Hobbs also     See prior     Cont tramadol 50mg bid prn   Increase Gabapentin 600mg tid from 300mg qid per response as tolerated   See above re mobic,   vitamin D     Revisit lab 2 weeks prior     Contact us prn     This visit:    Interim lab 2/14/23:    C3 189  C4 41    LAURENT neg    WBC 8.12; Hgb 12.2; plt 261    Creat 1.04 GFR 55 prior 53    Alb 3.6    ESR not done? CRP 8    Interim MRI left knee to further evaluate for inflammatory arthritis not overt on exam:     It may also show some mechanical problem that she can touch base with Dr. Hobbs also        Prev noted/prior plan:  (We had planned to see her much sooner? She had missed.     She has interim symptoms of inflammatory arthritis worse in left knee     She saw Dr. Hobbs interim as per below     Looks like she have had interim problem with diverticulosis per very limited records in Epic     Yes she had diverticulosis surgery and tells me 'it was real bad' but not much other details but doing better noted     She also had cataract surgery     She has a lot of pain at rest     She has room to increase the gabapentin     No overt synovitis   Went over lab   I am not seeing active inflammatory arthritis or connective tissue disease on exam or lab noted.     Careful with Mobic given interim GFR if using (last given in 8 months ago with 4 refills noted)     Consider MRI most symptomatic joint to further evaluate for inflammatory arthritis not over on exam or lab  She tells me her left knee is most symptomatic     Tells me she saw Dr. Hobbs who is holding off anything further for the right knee as told her the tumor has been there for some time and observing for now     MRI left knee to further evaluate for inflammatory arthritis not overt on exam   It may also show some mechanical problem that she can touch base with Dr. Hobbs also     See prior     Cont tramadol 50mg bid prn   Increase Gabapentin 600mg tid from 300mg  qid per response as tolerated   See above re mobic,   vitamin D)    She has interim symptoms of inflammatory arthritis    Did she get MRI left knee and see Dr. Hobbs? She tells me she had the MRI left knee and may have showed mild problem 'wear and tear' and given bilateral knee injections that helped some    She tells me Dr. Hobbs received the MRI results, but I never received them?    No overt synovitis  DJD    Verbal education    Went over lab    She should avoid Mobic oral NSAIDs moving forward for now given her renal function; can use tylenol instead    tramadol 50mg bid prn   Gabapentin 600mg tid   vitamin D    Fernanda will seek her MRI left knee results    Will otherwise observe    She will consider going to different lab moving forward    Revisit lab 2 weeks prior    Contact us prn    ANA1:320+ with negative specific Ab repeat LAURENT neg repeat LAURENT neg dsDNA neg repeat LAURENT neg repeat LAURENT neg RF neg CCP neg HLAB27 neg Uric acid 5.5 HepBsAbsAgcoreAb neg HCV neg     There is some history to suggest inflammatory arthritis.  Joint pain and swelling in knees  Neurogenic pain in legs and feet     Prev noted/prior plan:  (No overt synovitis but some recent prednisone noted  Diminished breath sounds but otherwise clear   I am not seeing active inflammatory arthritis or connective tissue disease on exam, but she had recent prednisone.   Verbal education and some written   Blood work to further evaluate  Add gabapentin 300mg qhs increasing to bid per response  Revisit 5 weeks   See below)     She tells me when she has joint pain and swelling,   she will have skin changes and bumps in the palm of her hands prior to the onset;   she will plan to have evaluation with Dermatology and see if she can also take a picture when recurs     There is also some mechanical and neurogenic pain.     4/26/2022 xray reports:     cervical spine Vertebrae and posterior elements: Normal.  Disc spaces: Normal.    Craniocervical junction:  Normal.    C1 and C2: Normal.    Soft tissues: Normal.    Additional findings: None seen.    IMPRESSION:    1.  Negative examination.           lumbar spine  Limitations: No technical limitations.    COMPARISON:  No prior relevant studies.    FINDINGS:  Alignment:Normal.    Vertebral bodies and posterior elements:  1. Degenerative changes noted in the posterior facets from L3 to S1.    Disc spaces: Normal.    Soft tissues: Normal.    Additional findings: None seen.    IMPRESSION:    1.  Degenerative changes.           Hands: Mild degenerative changes of the bilateral hands with osteophytes, joint space narrowing, and subchondral sclerosis primarily at the DIP and PIP joints, first metacarpophalangeal joint. Soft tissues are unremarkable. No radiopaque foreign body.    IMPRESSION:    Mild osteoarthritis.             Hips pelvis normal     Knees: No joint effusion. Mild degenerative changes with osteophytes, joint space narrowing, and subchondral sclerosis.  Sclerotic lesion within the distal right femur.    The surrounding soft tissues are unremarkable. IMPRESSION:    1. Mild bilateral knee osteoarthritis.  2. Sclerotic lesion within the distal right femur likely represents an enchondroma. Does the patient have prior imaging available for comparison? Consider MRI with contrast if further evaluation is clinically warranted.         right foot: mild joint space loss at the interphalangeal joints of the toes and at the first digit tarsometatarsal joint. No significant osseous erosions seen. Screws are noted in the medial  malleolus of the right ankle. Soft tissues unremarkable.    IMPRESSION:    Only mild osteoarthritic changes in the right foot. Screws noted in the medial malleolus of the right ankle.             left foot  Limitations: The images appear technically satisfactory.    COMPARISON:  No prior relevant studies are available at this time.    FINDINGS:  Osseous structures: Normal.    Joints: Normal.    Soft  tissues: Normal.    Additional findings: None seen.    IMPRESSION:    1.  Negative examination.              Interim 2/18/22 DEXA: L2 t -1.9; total hip left t -1.77; left fem neck t -1.18         Neuropathy per records     Carpal tunnel surgery     ankle surgery     severe COPD; steroid dependent; chronic hypercapnic respiratory failure; history COVID infection; history recurrent Pneumonias;      Has been managing with:     Meloxicam 15mg daily     Toradol she should not combine with meloxicam which she uses meloxicam     Prednisone 20mg bid 7 days given weeks prior to initial visit     requip      adderall noted     Citalopram      she rather clarifies she no longer uses Lexapro noted     Albuterol     lipitor     She tells me she also has diabetes and had been on insulin in the past noted     Has been working with:     Severe COPD working with Pulmonary     Records PCP Saulo LOMELI some of most recent note 7/1/21: HPI cough and wheeze; A/P: COPD acute respiratory infection; LAURENT+: Prednisone 20mg bid for 7 days; levofloxacin 500mg daily 7 days     Records Dr. Leblanc 6/26/21: COPD likely severe; chronic hypercapnic respiratory failure; history COVID infection; history recurrent Pneumonias; active tobacco use; obtain CXR and PFTs from PCP or her Pulmonologist; cont inhaler with trelegy for maintenance therapy and albuterol nebulizer treatments; review ABG results from recent hospitalization and assess need for trilogy machine. F/u 1 month     Records Payal CHUNG some of recent note 3/24/21: Hospitalized 12/2020 [facility] for sepsis and bladder infection; completed antibiotics and symptoms resolved. Impression: chronic respiratory failure; severe COPD; steroid dependent; levaquin and prednisone taper; CT chest; cont chatix; revisit 6-8 weeks     Review of medical records as stated above.  Lab +/- imaging and other ordered diagnostic studies to further evaluate.  See the orders associated  with this note visit.  Medications as prescribed as tolerated.    Education including verbal and those noted in the patient instructions.  Revisit as scheduled and call prn.    The following diagnoses influence medical decision making and/or need further workup including the orders listed below:    Pain in other joint  -     LAURENT by Anand DUNN; Future; Expected date: 06/09/2023  -     Comprehensive Metabolic Panel; Future; Expected date: 06/09/2023  -     C-Reactive Protein; Future; Expected date: 06/09/2023  -     C3 Complement; Future; Expected date: 06/09/2023  -     C4 Complement; Future; Expected date: 06/09/2023  -     CBC Auto Differential; Future; Expected date: 06/09/2023  -     Urinalysis Microscopic; Future; Expected date: 06/09/2023  -     Sedimentation rate; Future; Expected date: 06/09/2023  -     CK; Future; Expected date: 06/09/2023  -     traMADoL (ULTRAM) 50 mg tablet; Take 1 tablet (50 mg total) by mouth 3 (three) times daily as needed (moderate to severe pain).  Dispense: 90 tablet; Refill: 4    Other osteoarthritis involving multiple joints  -     LAURENT by Anand DUNN; Future; Expected date: 06/09/2023  -     Comprehensive Metabolic Panel; Future; Expected date: 06/09/2023  -     C-Reactive Protein; Future; Expected date: 06/09/2023  -     C3 Complement; Future; Expected date: 06/09/2023  -     C4 Complement; Future; Expected date: 06/09/2023  -     CBC Auto Differential; Future; Expected date: 06/09/2023  -     Urinalysis Microscopic; Future; Expected date: 06/09/2023  -     Sedimentation rate; Future; Expected date: 06/09/2023  -     CK; Future; Expected date: 06/09/2023  -     traMADoL (ULTRAM) 50 mg tablet; Take 1 tablet (50 mg total) by mouth 3 (three) times daily as needed (moderate to severe pain).  Dispense: 90 tablet; Refill: 4    Neuropathy  -     LAURENT by Ricardo DUNNx; Future; Expected date: 06/09/2023  -     Comprehensive Metabolic Panel; Future; Expected date: 06/09/2023  -      C-Reactive Protein; Future; Expected date: 06/09/2023  -     C3 Complement; Future; Expected date: 06/09/2023  -     C4 Complement; Future; Expected date: 06/09/2023  -     CBC Auto Differential; Future; Expected date: 06/09/2023  -     Urinalysis Microscopic; Future; Expected date: 06/09/2023  -     Sedimentation rate; Future; Expected date: 06/09/2023  -     CK; Future; Expected date: 06/09/2023  -     gabapentin (NEURONTIN) 300 MG capsule; Take 2 capsules (600 mg total) by mouth 3 (three) times daily.  Dispense: 180 capsule; Refill: 4  -     traMADoL (ULTRAM) 50 mg tablet; Take 1 tablet (50 mg total) by mouth 3 (three) times daily as needed (moderate to severe pain).  Dispense: 90 tablet; Refill: 4    Chronic obstructive pulmonary disease, unspecified COPD type  -     LAURENT by SHAUN w/Rflx; Future; Expected date: 06/09/2023  -     Comprehensive Metabolic Panel; Future; Expected date: 06/09/2023  -     C-Reactive Protein; Future; Expected date: 06/09/2023  -     C3 Complement; Future; Expected date: 06/09/2023  -     C4 Complement; Future; Expected date: 06/09/2023  -     CBC Auto Differential; Future; Expected date: 06/09/2023  -     Urinalysis Microscopic; Future; Expected date: 06/09/2023  -     Sedimentation rate; Future; Expected date: 06/09/2023  -     CK; Future; Expected date: 06/09/2023    Osteopenia of multiple sites  -     LAURENT by SHAUN w/Rflx; Future; Expected date: 06/09/2023  -     Comprehensive Metabolic Panel; Future; Expected date: 06/09/2023  -     C-Reactive Protein; Future; Expected date: 06/09/2023  -     C3 Complement; Future; Expected date: 06/09/2023  -     C4 Complement; Future; Expected date: 06/09/2023  -     CBC Auto Differential; Future; Expected date: 06/09/2023  -     Urinalysis Microscopic; Future; Expected date: 06/09/2023  -     Sedimentation rate; Future; Expected date: 06/09/2023  -     CK; Future; Expected date: 06/09/2023  -     cholecalciferol, vitamin D3, (VITAMIN D3) 25 mcg  (1,000 unit) capsule; Take 1 capsule (1,000 Units total) by mouth once daily.  Dispense: 30 capsule; Refill: 12    Screening for rheumatic disorder  -     LAURENT by Anand DUNN; Future; Expected date: 06/09/2023  -     Comprehensive Metabolic Panel; Future; Expected date: 06/09/2023  -     C-Reactive Protein; Future; Expected date: 06/09/2023  -     C3 Complement; Future; Expected date: 06/09/2023  -     C4 Complement; Future; Expected date: 06/09/2023  -     CBC Auto Differential; Future; Expected date: 06/09/2023  -     Urinalysis Microscopic; Future; Expected date: 06/09/2023  -     Sedimentation rate; Future; Expected date: 06/09/2023  -     CK; Future; Expected date: 06/09/2023    Enchondroma of femur, right  -     LAURENT by Anand DUNN; Future; Expected date: 06/09/2023  -     Comprehensive Metabolic Panel; Future; Expected date: 06/09/2023  -     C-Reactive Protein; Future; Expected date: 06/09/2023  -     C3 Complement; Future; Expected date: 06/09/2023  -     C4 Complement; Future; Expected date: 06/09/2023  -     CBC Auto Differential; Future; Expected date: 06/09/2023  -     Urinalysis Microscopic; Future; Expected date: 06/09/2023  -     Sedimentation rate; Future; Expected date: 06/09/2023  -     CK; Future; Expected date: 06/09/2023    Stage 3a chronic kidney disease  -     LAURENT by Anand DUNN; Future; Expected date: 06/09/2023  -     Comprehensive Metabolic Panel; Future; Expected date: 06/09/2023  -     C-Reactive Protein; Future; Expected date: 06/09/2023  -     C3 Complement; Future; Expected date: 06/09/2023  -     C4 Complement; Future; Expected date: 06/09/2023  -     CBC Auto Differential; Future; Expected date: 06/09/2023  -     Urinalysis Microscopic; Future; Expected date: 06/09/2023  -     Sedimentation rate; Future; Expected date: 06/09/2023  -     CK; Future; Expected date: 06/09/2023    Medication monitoring encounter  -     LAURENT by IFA, w/Rflx; Future; Expected date: 06/09/2023  -      Comprehensive Metabolic Panel; Future; Expected date: 06/09/2023  -     C-Reactive Protein; Future; Expected date: 06/09/2023  -     C3 Complement; Future; Expected date: 06/09/2023  -     C4 Complement; Future; Expected date: 06/09/2023  -     CBC Auto Differential; Future; Expected date: 06/09/2023  -     Urinalysis Microscopic; Future; Expected date: 06/09/2023  -     Sedimentation rate; Future; Expected date: 06/09/2023  -     CK; Future; Expected date: 06/09/2023      Follow up in about 4 months (around 6/24/2023).     Zachery Brooks MD

## 2023-02-23 ENCOUNTER — TELEPHONE (OUTPATIENT)
Dept: RHEUMATOLOGY | Facility: CLINIC | Age: 58
End: 2023-02-23
Payer: MEDICAID

## 2023-02-23 NOTE — TELEPHONE ENCOUNTER
Lvm for patient stating that we did receive her lab results. We just need to know if she did her MRI or not. Instructed patient to give us a call back to let us know.

## 2023-02-24 ENCOUNTER — OFFICE VISIT (OUTPATIENT)
Dept: RHEUMATOLOGY | Facility: CLINIC | Age: 58
End: 2023-02-24
Payer: MEDICAID

## 2023-02-24 VITALS
HEIGHT: 64 IN | RESPIRATION RATE: 18 BRPM | BODY MASS INDEX: 26.72 KG/M2 | WEIGHT: 156.5 LBS | OXYGEN SATURATION: 98 % | HEART RATE: 69 BPM | DIASTOLIC BLOOD PRESSURE: 77 MMHG | SYSTOLIC BLOOD PRESSURE: 108 MMHG

## 2023-02-24 DIAGNOSIS — M25.59 PAIN IN OTHER JOINT: Primary | ICD-10-CM

## 2023-02-24 DIAGNOSIS — G62.9 NEUROPATHY: ICD-10-CM

## 2023-02-24 DIAGNOSIS — M85.89 OSTEOPENIA OF MULTIPLE SITES: ICD-10-CM

## 2023-02-24 DIAGNOSIS — N18.31 STAGE 3A CHRONIC KIDNEY DISEASE: ICD-10-CM

## 2023-02-24 DIAGNOSIS — Z51.81 MEDICATION MONITORING ENCOUNTER: ICD-10-CM

## 2023-02-24 DIAGNOSIS — J44.9 CHRONIC OBSTRUCTIVE PULMONARY DISEASE, UNSPECIFIED COPD TYPE: ICD-10-CM

## 2023-02-24 DIAGNOSIS — Z13.89 SCREENING FOR RHEUMATIC DISORDER: ICD-10-CM

## 2023-02-24 DIAGNOSIS — M15.8 OTHER OSTEOARTHRITIS INVOLVING MULTIPLE JOINTS: ICD-10-CM

## 2023-02-24 DIAGNOSIS — D16.21 ENCHONDROMA OF FEMUR, RIGHT: ICD-10-CM

## 2023-02-24 PROCEDURE — 1160F PR REVIEW ALL MEDS BY PRESCRIBER/CLIN PHARMACIST DOCUMENTED: ICD-10-PCS | Mod: CPTII,S$GLB,, | Performed by: INTERNAL MEDICINE

## 2023-02-24 PROCEDURE — 3008F PR BODY MASS INDEX (BMI) DOCUMENTED: ICD-10-PCS | Mod: CPTII,S$GLB,, | Performed by: INTERNAL MEDICINE

## 2023-02-24 PROCEDURE — 1159F PR MEDICATION LIST DOCUMENTED IN MEDICAL RECORD: ICD-10-PCS | Mod: CPTII,S$GLB,, | Performed by: INTERNAL MEDICINE

## 2023-02-24 PROCEDURE — 1160F RVW MEDS BY RX/DR IN RCRD: CPT | Mod: CPTII,S$GLB,, | Performed by: INTERNAL MEDICINE

## 2023-02-24 PROCEDURE — 3078F DIAST BP <80 MM HG: CPT | Mod: CPTII,S$GLB,, | Performed by: INTERNAL MEDICINE

## 2023-02-24 PROCEDURE — 99214 OFFICE O/P EST MOD 30 MIN: CPT | Mod: S$GLB,,, | Performed by: INTERNAL MEDICINE

## 2023-02-24 PROCEDURE — 99214 PR OFFICE/OUTPT VISIT, EST, LEVL IV, 30-39 MIN: ICD-10-PCS | Mod: S$GLB,,, | Performed by: INTERNAL MEDICINE

## 2023-02-24 PROCEDURE — 3008F BODY MASS INDEX DOCD: CPT | Mod: CPTII,S$GLB,, | Performed by: INTERNAL MEDICINE

## 2023-02-24 PROCEDURE — 3078F PR MOST RECENT DIASTOLIC BLOOD PRESSURE < 80 MM HG: ICD-10-PCS | Mod: CPTII,S$GLB,, | Performed by: INTERNAL MEDICINE

## 2023-02-24 PROCEDURE — 3074F PR MOST RECENT SYSTOLIC BLOOD PRESSURE < 130 MM HG: ICD-10-PCS | Mod: CPTII,S$GLB,, | Performed by: INTERNAL MEDICINE

## 2023-02-24 PROCEDURE — 3074F SYST BP LT 130 MM HG: CPT | Mod: CPTII,S$GLB,, | Performed by: INTERNAL MEDICINE

## 2023-02-24 PROCEDURE — 1159F MED LIST DOCD IN RCRD: CPT | Mod: CPTII,S$GLB,, | Performed by: INTERNAL MEDICINE

## 2023-02-24 RX ORDER — VIT C/E/ZN/COPPR/LUTEIN/ZEAXAN 250MG-90MG
1000 CAPSULE ORAL DAILY
Qty: 30 CAPSULE | Refills: 12 | Status: SHIPPED | OUTPATIENT
Start: 2023-02-24

## 2023-02-24 RX ORDER — GABAPENTIN 300 MG/1
600 CAPSULE ORAL 3 TIMES DAILY
Qty: 180 CAPSULE | Refills: 4 | Status: SHIPPED | OUTPATIENT
Start: 2023-02-24

## 2023-02-24 RX ORDER — TRAMADOL HYDROCHLORIDE 50 MG/1
50 TABLET ORAL 3 TIMES DAILY PRN
Qty: 90 TABLET | Refills: 4 | Status: SHIPPED | OUTPATIENT
Start: 2023-02-24